# Patient Record
Sex: FEMALE | Race: OTHER | NOT HISPANIC OR LATINO
[De-identification: names, ages, dates, MRNs, and addresses within clinical notes are randomized per-mention and may not be internally consistent; named-entity substitution may affect disease eponyms.]

---

## 2019-03-26 PROBLEM — Z00.00 ENCOUNTER FOR PREVENTIVE HEALTH EXAMINATION: Status: ACTIVE | Noted: 2019-03-26

## 2019-04-26 ENCOUNTER — APPOINTMENT (OUTPATIENT)
Dept: ORTHOPEDIC SURGERY | Facility: CLINIC | Age: 64
End: 2019-04-26
Payer: COMMERCIAL

## 2019-04-26 PROCEDURE — 99213 OFFICE O/P EST LOW 20 MIN: CPT

## 2019-04-26 NOTE — PHYSICAL EXAM
[Normal RLE] : Right Lower Extremity: No scars, rashes, lesions, ulcers, skin intact [Normal LLE] : Left Lower Extremity: No scars, rashes, lesions, ulcers, skin intact [Normal Touch] : sensation intact for touch [Normal] : No swelling, no edema, normal pedal pulses and normal temperature [Poor Appearance] : well-appearing [Acute Distress] : not in acute distress [Obese] : not obese [de-identified] : Right Lower Extremity\par o Knee :\par ¦ Inspection/Palpation : no tenderness to palpation along the joint lines, no swelling, mild varus alignment \par ¦ Range of Motion : 0 - 115 degrees, no crepitus\par ¦ Stability : no valgus or varus instability present on provocative testing, Lachman’s Test (-)\par ¦ Strength : flexion and extension 5/5\par o Muscle Bulk : normal muscle bulk present\par o Skin : no erythema, no ecchymosis \par o Sensation : sensation to pin intact\par o Vascular Exam : no edema, no cyanosis, dorsalis pedis artery pulse 2+, posterior tibial artery pulse 2+ \par \par Left Lower Extremity\par o Knee :\par ¦ Inspection/Palpation : no tenderness to palpation along the joint lines, trace effusion, mild varus alignment \par ¦ Range of Motion : 0 - 130 degrees, no crepitus\par ¦ Stability : no valgus or varus instability present on provocative testing, Lachman’s Test (-)\par ¦ Strength : flexion and extension 5/5\par o Muscle Bulk : normal muscle bulk present\par o Skin : no erythema, no ecchymosis \par o Sensation : sensation to pin intact\par o Vascular Exam : no edema, no cyanosis, dorsalis pedis artery pulse 2+, posterior tibial artery pulse 2+

## 2019-04-26 NOTE — DISCUSSION/SUMMARY
[de-identified] : The underlying pathophysiology was reviewed in great detail with the patient as well as the various treatment options, including ice, analgesics, NSAIDs, Physical therapy, steroid injections.\par \par The patient wishes to proceed with PHYSICAL THERAPY at this time and a prescription was given. \par \par FU PRN.

## 2019-04-26 NOTE — ADDENDUM
[FreeTextEntry1] : I, Luh Llanes, acted solely as a scribe for Dr. Thad Alex on this date 04/26/2019.

## 2019-04-26 NOTE — END OF VISIT
[FreeTextEntry3] : All medical record entries made by the Zaidibkathi were at my, Dr. Thad Alex, direction and personally dictated by me on 04/26/2019. I have reviewed the chart and agree that the record accurately reflects my personal performance of the history, physical exam, assessment and plan. I have also personally directed, reviewed, and agreed with the chart.

## 2019-04-26 NOTE — HISTORY OF PRESENT ILLNESS
[de-identified] : 63 year old female presents for an evaluation of bilateral knee pain, she has been diagnosed with moderate to advanced medial compartment osteoarthritis. She describes a constant aching pain along the medial aspect of her knees that is exacerbated with walking, bending, and climbing stairs. She has been treated with corticosteroid injections and physical therapy in the past and says that it helped to improve her symptoms, she would like to return to physical therapy at this time.

## 2019-05-10 ENCOUNTER — TRANSCRIPTION ENCOUNTER (OUTPATIENT)
Age: 64
End: 2019-05-10

## 2021-05-11 ENCOUNTER — NON-APPOINTMENT (OUTPATIENT)
Age: 66
End: 2021-05-11

## 2021-05-11 ENCOUNTER — APPOINTMENT (OUTPATIENT)
Dept: ORTHOPEDIC SURGERY | Facility: CLINIC | Age: 66
End: 2021-05-11
Payer: COMMERCIAL

## 2021-05-11 VITALS — WEIGHT: 126 LBS | HEIGHT: 60 IN | BODY MASS INDEX: 24.74 KG/M2

## 2021-05-11 PROCEDURE — 73564 X-RAY EXAM KNEE 4 OR MORE: CPT | Mod: LT

## 2021-05-11 PROCEDURE — 99213 OFFICE O/P EST LOW 20 MIN: CPT

## 2021-05-11 PROCEDURE — 99072 ADDL SUPL MATRL&STAF TM PHE: CPT

## 2021-05-11 NOTE — DISCUSSION/SUMMARY
[de-identified] : The underlying pathophysiology was reviewed in great detail with the patient as well as the various treatment options, including ice, analgesics, NSAIDs, Physical therapy, steroid injections, hyaluronic gel injections. \par \par The patient wishes to proceed with PHYSICAL THERAPY at this time and a prescription was given. \par \par Activity modifications and restrictions were discussed. I advised avoiding deep bending, squatting and high intensity activity.\par \par I have recommended utilizing a knee sleeve to provide added support and stability.\par \par A letter was provided for motor transportation today. \par \par FU 6 weeks or prn. \par \par All questions were answered, all alternatives discussed and the patient is in complete agreement with that plan. Follow-up appointment as instructed. Any issues and the patient will call or come in sooner.

## 2021-05-11 NOTE — PHYSICAL EXAM
[Normal RLE] : Right Lower Extremity: No scars, rashes, lesions, ulcers, skin intact [Normal LLE] : Left Lower Extremity: No scars, rashes, lesions, ulcers, skin intact [Normal Touch] : sensation intact for touch [Normal] : No swelling, no edema, normal pedal pulses and normal temperature [Poor Appearance] : well-appearing [Acute Distress] : not in acute distress [Obese] : not obese [de-identified] : Right Lower Extremity\par o Knee :\par ¦ Inspection/Palpation :tenderness to palpation along the joint lines, no swelling, mild varus alignment \par ¦ Range of Motion : 0 - 115 degrees, no crepitus\par ¦ Stability : no valgus or varus instability present on provocative testing, Lachman’s Test (-)\par ¦ Strength : flexion and extension 5/5\par o Muscle Bulk : normal muscle bulk present\par o Skin : no erythema, no ecchymosis \par o Sensation : sensation to pin intact\par o Vascular Exam : no edema, no cyanosis, dorsalis pedis artery pulse 2+, posterior tibial artery pulse 2+ \par \par Left Lower Extremity\par o Knee :\par ¦ Inspection/Palpation : no tenderness to palpation along the joint lines, trace effusion, mild varus alignment \par ¦ Range of Motion : 0 - 130 degrees, no crepitus\par ¦ Stability : no valgus or varus instability present on provocative testing, Lachman’s Test (-)\par ¦ Strength : flexion and extension 5/5\par o Muscle Bulk : normal muscle bulk present\par o Skin : no erythema, no ecchymosis \par o Sensation : sensation to pin intact\par o Vascular Exam : no edema, no cyanosis, dorsalis pedis artery pulse 2+, posterior tibial artery pulse 2+  [de-identified] : o Right Knee : AP, lateral, sunrise, and Steward views of the knee were obtained, there are no soft tissue abnormalities, no fractures, alignment is normal, moderate to severe tricompartmental osteoarthritis with bone on bone apposition of the medial compartment , normal bone density, no bony lesions.\par \par o Left Knee : AP, lateral, sunrise, and Steward views of the knee were obtained, there are no soft tissue abnormalities, no fractures, alignment is normal,moderate to severe tricompartmental osteoarthritis with bone on bone apposition of the medial compartment normal bone density, no bony lesions.\par \par

## 2021-05-11 NOTE — HISTORY OF PRESENT ILLNESS
[de-identified] : 65 year old female presents for an evaluation of bilateral knee pain, she has been diagnosed with moderate to advanced medial compartment osteoarthritis. Patient has completed multiple courses of physical therapy for the knees noting good relief in symptoms. She notes when she is not in physical therapy her pain increases.  She describes a constant aching pain along the medial aspect of her knees that is exacerbated with walking, bending, and climbing stairs. She has been treated with corticosteroid injections and physical therapy in the past and says that it helped to improve her symptoms, she would like to return to physical therapy at this time.

## 2021-11-19 ENCOUNTER — APPOINTMENT (OUTPATIENT)
Dept: ORTHOPEDIC SURGERY | Facility: CLINIC | Age: 66
End: 2021-11-19
Payer: COMMERCIAL

## 2021-11-19 VITALS — WEIGHT: 124 LBS | BODY MASS INDEX: 24.35 KG/M2 | HEIGHT: 60 IN

## 2021-11-19 PROCEDURE — 99213 OFFICE O/P EST LOW 20 MIN: CPT

## 2021-11-19 NOTE — HISTORY OF PRESENT ILLNESS
[de-identified] : 65 year old female presents for an evaluation of bilateral knee pain, she has been diagnosed with moderate to advanced medial compartment osteoarthritis. Patient has completed multiple courses of physical therapy for the knees noting good relief in symptoms. She notes when she is not in physical therapy her pain increases.  She describes a constant aching pain along the medial aspect of her knees that is exacerbated with walking, bending, and climbing stairs. She has been treated with corticosteroid injections and physical therapy in the past and says that it helped to improve her symptoms, she would like to return to physical therapy at this time.

## 2021-11-19 NOTE — DISCUSSION/SUMMARY
[de-identified] : The underlying pathophysiology was reviewed in great detail with the patient as well as the various treatment options, including ice, analgesics, NSAIDs, Physical therapy, steroid injections, hyaluronic gel injections. \par \par The patient wishes to proceed with gel injections\par \par Monovisc will be ordered for both knees\par \par FU 6 weeks or prn. \par \par All questions were answered, all alternatives discussed and the patient is in complete agreement with that plan. Follow-up appointment as instructed. Any issues and the patient will call or come in sooner.

## 2021-11-19 NOTE — PHYSICAL EXAM
[Normal RLE] : Right Lower Extremity: No scars, rashes, lesions, ulcers, skin intact [Normal LLE] : Left Lower Extremity: No scars, rashes, lesions, ulcers, skin intact [Normal Touch] : sensation intact for touch [Normal] : No swelling, no edema, normal pedal pulses and normal temperature [Poor Appearance] : well-appearing [Acute Distress] : not in acute distress [Obese] : not obese [de-identified] : Right Lower Extremity\par o Knee :\par ¦ Inspection/Palpation :tenderness to palpation along the joint lines, no swelling, mild varus alignment \par ¦ Range of Motion : 0 - 115 degrees, no crepitus\par ¦ Stability : no valgus or varus instability present on provocative testing, Lachman’s Test (-)\par ¦ Strength : flexion and extension 5/5\par o Muscle Bulk : normal muscle bulk present\par o Skin : no erythema, no ecchymosis \par o Sensation : sensation to pin intact\par o Vascular Exam : no edema, no cyanosis, dorsalis pedis artery pulse 2+, posterior tibial artery pulse 2+ \par \par Left Lower Extremity\par o Knee :\par ¦ Inspection/Palpation : no tenderness to palpation along the joint lines, trace effusion, mild varus alignment \par ¦ Range of Motion : 0 - 130 degrees, no crepitus\par ¦ Stability : no valgus or varus instability present on provocative testing, Lachman’s Test (-)\par ¦ Strength : flexion and extension 5/5\par o Muscle Bulk : normal muscle bulk present\par o Skin : no erythema, no ecchymosis \par o Sensation : sensation to pin intact\par o Vascular Exam : no edema, no cyanosis, dorsalis pedis artery pulse 2+, posterior tibial artery pulse 2+

## 2021-12-17 ENCOUNTER — APPOINTMENT (OUTPATIENT)
Dept: ORTHOPEDIC SURGERY | Facility: CLINIC | Age: 66
End: 2021-12-17
Payer: COMMERCIAL

## 2021-12-17 VITALS — BODY MASS INDEX: 24.35 KG/M2 | WEIGHT: 124 LBS | HEIGHT: 60 IN

## 2021-12-17 PROCEDURE — 20610 DRAIN/INJ JOINT/BURSA W/O US: CPT | Mod: RT

## 2021-12-17 NOTE — DISCUSSION/SUMMARY
[de-identified] : The underlying pathophysiology was reviewed in great detail with the patient as well as the various treatment options, including ice, analgesics, NSAIDs, Physical therapy, steroid injections, hyaluronic gel injections. \par \par The patient elected to receive a Monovisc/ corticosteroid injectio and aspiration into her right knee today and tolerated it well. I instructed the patient on ROM exercises, and told them to take it easy. The use of ice and rest was reviewed with the patient. The patient may resume activities in a couple of days. I reminded the patient that it takes 4 to 6 weeks after the Monovisc injection to feel symptom relief \par  \par Activity modifications and restrictions were discussed. I advised avoiding deep bending, squatting and high intensity activity.\par \par A home exercise sheet was given and discussed with the patient to follow. \par \par I have recommended utilizing a knee sleeve to provide added support and stability. \par \par FU before 12/20/2021 for left knee Monovisc injection. \par \par All questions were answered, all alternatives discussed and the patient is in complete agreement with that plan. Follow-up appointment as instructed. Any issues and the patient will call or come in sooner.

## 2021-12-17 NOTE — PROCEDURE
[de-identified] : At this point I recommended a therapeutic injection and under sterile precautions an  injection of 4 cc of Monovisc (Lot: [id number] , Expiration: 06/2023), was placed into the joint of the RIGHT knee without complication followed by an injection of 0.5 cc 1% lidocaine with 0.5 cc of Kenalog and 0.5 cc of Dexamethasone - was placed into the joint of the RIGHT knee without complication,  after 20 cc of clear synovial fluid was aspirated and after several minutes, the patient felt significant relief.\par

## 2021-12-17 NOTE — HISTORY OF PRESENT ILLNESS
[de-identified] : 66 year old female presents for an evaluation of bilateral knee pain, she has been diagnosed with moderate to advanced medial compartment osteoarthritis. Patient has completed multiple courses of physical therapy for the knees noting good relief in symptoms. She notes when she is not in physical therapy her pain increases.  She describes a constant aching pain along the medial aspect of her knees that is exacerbated with walking, bending, and climbing stairs. She has been treated with corticosteroid injections and physical therapy in the past and says that it helped to improve her symptoms. She is here today for a right knee monovisc injection.

## 2021-12-17 NOTE — PHYSICAL EXAM
[Normal RLE] : Right Lower Extremity: No scars, rashes, lesions, ulcers, skin intact [Normal LLE] : Left Lower Extremity: No scars, rashes, lesions, ulcers, skin intact [Normal Touch] : sensation intact for touch [Normal] : No swelling, no edema, normal pedal pulses and normal temperature [Poor Appearance] : well-appearing [Acute Distress] : not in acute distress [Obese] : not obese [de-identified] : Right Lower Extremity\par o Knee :\par ¦ Inspection/Palpation :tenderness to palpation along the joint lines, no swelling, mild varus alignment \par ¦ Range of Motion : 0 - 115 degrees, no crepitus\par ¦ Stability : no valgus or varus instability present on provocative testing, Lachman’s Test (-)\par ¦ Strength : flexion and extension 5/5\par o Muscle Bulk : normal muscle bulk present\par o Skin : no erythema, no ecchymosis \par o Sensation : sensation to pin intact\par o Vascular Exam : no edema, no cyanosis, dorsalis pedis artery pulse 2+, posterior tibial artery pulse 2+ \par \par Left Lower Extremity\par o Knee :\par ¦ Inspection/Palpation : no tenderness to palpation along the joint lines, trace effusion, mild varus alignment \par ¦ Range of Motion : 0 - 130 degrees, no crepitus\par ¦ Stability : no valgus or varus instability present on provocative testing, Lachman’s Test (-)\par ¦ Strength : flexion and extension 5/5\par o Muscle Bulk : normal muscle bulk present\par o Skin : no erythema, no ecchymosis \par o Sensation : sensation to pin intact\par o Vascular Exam : no edema, no cyanosis, dorsalis pedis artery pulse 2+, posterior tibial artery pulse 2+

## 2021-12-28 ENCOUNTER — APPOINTMENT (OUTPATIENT)
Dept: ORTHOPEDIC SURGERY | Facility: CLINIC | Age: 66
End: 2021-12-28
Payer: COMMERCIAL

## 2021-12-28 VITALS — BODY MASS INDEX: 24.35 KG/M2 | HEIGHT: 60 IN | WEIGHT: 124 LBS

## 2021-12-28 PROCEDURE — 20610 DRAIN/INJ JOINT/BURSA W/O US: CPT | Mod: LT

## 2021-12-29 NOTE — PROCEDURE
[de-identified] : At this point I recommended a therapeutic injection and under sterile precautions an  injection of 4 cc of Monovisc (Lot: [id number] , Expiration: 03/31/2023), was placed into the joint of the Left knee without complication

## 2021-12-29 NOTE — DISCUSSION/SUMMARY
[de-identified] : The underlying pathophysiology was reviewed in great detail with the patient as well as the various treatment options, including ice, analgesics, NSAIDs, Physical therapy, steroid injections, hyaluronic gel injections. \par \par The patient elected to receive a Monovisc injection and aspiration into her left knee today and tolerated it well. I instructed the patient on ROM exercises, and told them to take it easy. The use of ice and rest was reviewed with the patient. The patient may resume activities in a couple of days. I reminded the patient that it takes 4 to 6 weeks after the Monovisc injection to feel symptom relief \par  \par Activity modifications and restrictions were discussed. I advised avoiding deep bending, squatting and high intensity activity.\par \par I have recommended utilizing a knee sleeve to provide added support and stability. \par \par FU 12 weeks or PRN\par \par All questions were answered, all alternatives discussed and the patient is in complete agreement with that plan. Follow-up appointment as instructed. Any issues and the patient will call or come in sooner.

## 2021-12-29 NOTE — PHYSICAL EXAM
[Normal RLE] : Right Lower Extremity: No scars, rashes, lesions, ulcers, skin intact [Normal LLE] : Left Lower Extremity: No scars, rashes, lesions, ulcers, skin intact [Normal Touch] : sensation intact for touch [Normal] : No swelling, no edema, normal pedal pulses and normal temperature [Poor Appearance] : well-appearing [Acute Distress] : not in acute distress [Obese] : not obese [de-identified] : Right Lower Extremity\par o Knee :\par ¦ Inspection/Palpation : mild tenderness to palpation along the joint lines, no swelling, mild varus alignment \par ¦ Range of Motion : 0 - 115 degrees, no crepitus\par ¦ Stability : no valgus or varus instability present on provocative testing, Lachman’s Test (-)\par ¦ Strength : flexion and extension 5/5\par o Muscle Bulk : normal muscle bulk present\par o Skin : no erythema, no ecchymosis \par o Sensation : sensation to pin intact\par o Vascular Exam : no edema, no cyanosis, dorsalis pedis artery pulse 2+, posterior tibial artery pulse 2+ \par \par Left Lower Extremity\par o Knee :\par ¦ Inspection/Palpation : mild tenderness to palpation about the medial knee, trace effusion, mild varus alignment \par ¦ Range of Motion : 0 - 130 degrees, no crepitus\par ¦ Stability : no valgus or varus instability present on provocative testing, Lachman’s Test (-)\par ¦ Strength : flexion and extension 5/5\par o Muscle Bulk : normal muscle bulk present\par o Skin : no erythema, no ecchymosis \par o Sensation : sensation to pin intact\par o Vascular Exam : no edema, no cyanosis, dorsalis pedis artery pulse 2+, posterior tibial artery pulse 2+

## 2021-12-29 NOTE — HISTORY OF PRESENT ILLNESS
[de-identified] : 66 year old female presents for follow up of bilateral knee pain. On 12/17/21 she received a right knee monovisc injection and presents today due to her left knee pain. Patient denies any adverse reaction to right knee monovisc injection. Patient has been diagnosed with moderate to advanced medial compartment osteoarthritis. Patient has completed multiple courses of physical therapy for the knees noting good relief in symptoms. She notes when she is not in physical therapy her pain increases.  She describes a constant aching pain along the medial aspect of her knees that is exacerbated with walking, bending, and climbing stairs. She is here today for right knee Monovisc injection.

## 2022-06-21 ENCOUNTER — APPOINTMENT (OUTPATIENT)
Dept: ORTHOPEDIC SURGERY | Facility: CLINIC | Age: 67
End: 2022-06-21

## 2022-07-05 ENCOUNTER — APPOINTMENT (OUTPATIENT)
Dept: ORTHOPEDIC SURGERY | Facility: CLINIC | Age: 67
End: 2022-07-05

## 2022-07-05 VITALS — BODY MASS INDEX: 24.74 KG/M2 | WEIGHT: 126 LBS | HEIGHT: 60 IN

## 2022-07-05 PROCEDURE — 99214 OFFICE O/P EST MOD 30 MIN: CPT | Mod: 25

## 2022-07-05 PROCEDURE — 20610 DRAIN/INJ JOINT/BURSA W/O US: CPT | Mod: LT

## 2022-10-11 ENCOUNTER — APPOINTMENT (OUTPATIENT)
Dept: ORTHOPEDIC SURGERY | Facility: CLINIC | Age: 67
End: 2022-10-11

## 2022-10-11 PROCEDURE — 20610 DRAIN/INJ JOINT/BURSA W/O US: CPT | Mod: RT

## 2022-10-11 PROCEDURE — 99214 OFFICE O/P EST MOD 30 MIN: CPT | Mod: 25

## 2022-12-19 ENCOUNTER — RX RENEWAL (OUTPATIENT)
Age: 67
End: 2022-12-19

## 2022-12-19 RX ORDER — DICLOFENAC SODIUM 75 MG/1
75 TABLET, DELAYED RELEASE ORAL
Qty: 60 | Refills: 1 | Status: ACTIVE | COMMUNITY
Start: 2022-10-11 | End: 1900-01-01

## 2023-05-30 ENCOUNTER — APPOINTMENT (OUTPATIENT)
Dept: ORTHOPEDIC SURGERY | Facility: CLINIC | Age: 68
End: 2023-05-30
Payer: COMMERCIAL

## 2023-05-30 VITALS
DIASTOLIC BLOOD PRESSURE: 78 MMHG | HEIGHT: 60 IN | HEART RATE: 88 BPM | WEIGHT: 124 LBS | SYSTOLIC BLOOD PRESSURE: 150 MMHG | BODY MASS INDEX: 24.35 KG/M2

## 2023-05-30 PROCEDURE — 20610 DRAIN/INJ JOINT/BURSA W/O US: CPT | Mod: LT

## 2023-05-30 PROCEDURE — 99214 OFFICE O/P EST MOD 30 MIN: CPT | Mod: 25

## 2023-05-30 PROCEDURE — 73564 X-RAY EXAM KNEE 4 OR MORE: CPT | Mod: RT

## 2023-07-14 ENCOUNTER — APPOINTMENT (OUTPATIENT)
Dept: ORTHOPEDIC SURGERY | Facility: CLINIC | Age: 68
End: 2023-07-14

## 2023-08-15 ENCOUNTER — APPOINTMENT (OUTPATIENT)
Dept: ORTHOPEDIC SURGERY | Facility: CLINIC | Age: 68
End: 2023-08-15
Payer: COMMERCIAL

## 2023-08-15 PROCEDURE — 99214 OFFICE O/P EST MOD 30 MIN: CPT

## 2023-11-22 ENCOUNTER — OUTPATIENT (OUTPATIENT)
Dept: OUTPATIENT SERVICES | Facility: HOSPITAL | Age: 68
LOS: 1 days | End: 2023-11-22
Payer: COMMERCIAL

## 2023-11-22 VITALS
DIASTOLIC BLOOD PRESSURE: 72 MMHG | RESPIRATION RATE: 14 BRPM | WEIGHT: 121.92 LBS | HEART RATE: 87 BPM | OXYGEN SATURATION: 99 % | HEIGHT: 60 IN | SYSTOLIC BLOOD PRESSURE: 130 MMHG | TEMPERATURE: 98 F

## 2023-11-22 DIAGNOSIS — Z01.818 ENCOUNTER FOR OTHER PREPROCEDURAL EXAMINATION: ICD-10-CM

## 2023-11-22 DIAGNOSIS — M17.11 UNILATERAL PRIMARY OSTEOARTHRITIS, RIGHT KNEE: ICD-10-CM

## 2023-11-22 LAB
ALBUMIN SERPL ELPH-MCNC: 4.4 G/DL — SIGNIFICANT CHANGE UP (ref 3.3–5)
ALBUMIN SERPL ELPH-MCNC: 4.4 G/DL — SIGNIFICANT CHANGE UP (ref 3.3–5)
ALP SERPL-CCNC: 64 U/L — SIGNIFICANT CHANGE UP (ref 30–120)
ALP SERPL-CCNC: 64 U/L — SIGNIFICANT CHANGE UP (ref 30–120)
ALT FLD-CCNC: 28 U/L — SIGNIFICANT CHANGE UP (ref 10–60)
ALT FLD-CCNC: 28 U/L — SIGNIFICANT CHANGE UP (ref 10–60)
ANION GAP SERPL CALC-SCNC: 10 MMOL/L — SIGNIFICANT CHANGE UP (ref 5–17)
ANION GAP SERPL CALC-SCNC: 10 MMOL/L — SIGNIFICANT CHANGE UP (ref 5–17)
APTT BLD: 32.1 SEC — SIGNIFICANT CHANGE UP (ref 24.5–35.6)
APTT BLD: 32.1 SEC — SIGNIFICANT CHANGE UP (ref 24.5–35.6)
AST SERPL-CCNC: 22 U/L — SIGNIFICANT CHANGE UP (ref 10–40)
AST SERPL-CCNC: 22 U/L — SIGNIFICANT CHANGE UP (ref 10–40)
BILIRUB SERPL-MCNC: 0.5 MG/DL — SIGNIFICANT CHANGE UP (ref 0.2–1.2)
BILIRUB SERPL-MCNC: 0.5 MG/DL — SIGNIFICANT CHANGE UP (ref 0.2–1.2)
BLD GP AB SCN SERPL QL: SIGNIFICANT CHANGE UP
BLD GP AB SCN SERPL QL: SIGNIFICANT CHANGE UP
BUN SERPL-MCNC: 18 MG/DL — SIGNIFICANT CHANGE UP (ref 7–23)
BUN SERPL-MCNC: 18 MG/DL — SIGNIFICANT CHANGE UP (ref 7–23)
CALCIUM SERPL-MCNC: 9.8 MG/DL — SIGNIFICANT CHANGE UP (ref 8.4–10.5)
CALCIUM SERPL-MCNC: 9.8 MG/DL — SIGNIFICANT CHANGE UP (ref 8.4–10.5)
CHLORIDE SERPL-SCNC: 101 MMOL/L — SIGNIFICANT CHANGE UP (ref 96–108)
CHLORIDE SERPL-SCNC: 101 MMOL/L — SIGNIFICANT CHANGE UP (ref 96–108)
CO2 SERPL-SCNC: 28 MMOL/L — SIGNIFICANT CHANGE UP (ref 22–31)
CO2 SERPL-SCNC: 28 MMOL/L — SIGNIFICANT CHANGE UP (ref 22–31)
CREAT SERPL-MCNC: 0.68 MG/DL — SIGNIFICANT CHANGE UP (ref 0.5–1.3)
CREAT SERPL-MCNC: 0.68 MG/DL — SIGNIFICANT CHANGE UP (ref 0.5–1.3)
EGFR: 94 ML/MIN/1.73M2 — SIGNIFICANT CHANGE UP
EGFR: 94 ML/MIN/1.73M2 — SIGNIFICANT CHANGE UP
GLUCOSE SERPL-MCNC: 98 MG/DL — SIGNIFICANT CHANGE UP (ref 70–99)
GLUCOSE SERPL-MCNC: 98 MG/DL — SIGNIFICANT CHANGE UP (ref 70–99)
HCT VFR BLD CALC: 41.1 % — SIGNIFICANT CHANGE UP (ref 34.5–45)
HCT VFR BLD CALC: 41.1 % — SIGNIFICANT CHANGE UP (ref 34.5–45)
HGB BLD-MCNC: 13.1 G/DL — SIGNIFICANT CHANGE UP (ref 11.5–15.5)
HGB BLD-MCNC: 13.1 G/DL — SIGNIFICANT CHANGE UP (ref 11.5–15.5)
INR BLD: 0.96 RATIO — SIGNIFICANT CHANGE UP (ref 0.85–1.18)
INR BLD: 0.96 RATIO — SIGNIFICANT CHANGE UP (ref 0.85–1.18)
MCHC RBC-ENTMCNC: 28.1 PG — SIGNIFICANT CHANGE UP (ref 27–34)
MCHC RBC-ENTMCNC: 28.1 PG — SIGNIFICANT CHANGE UP (ref 27–34)
MCHC RBC-ENTMCNC: 31.9 GM/DL — LOW (ref 32–36)
MCHC RBC-ENTMCNC: 31.9 GM/DL — LOW (ref 32–36)
MCV RBC AUTO: 88.2 FL — SIGNIFICANT CHANGE UP (ref 80–100)
MCV RBC AUTO: 88.2 FL — SIGNIFICANT CHANGE UP (ref 80–100)
NRBC # BLD: 0 /100 WBCS — SIGNIFICANT CHANGE UP (ref 0–0)
NRBC # BLD: 0 /100 WBCS — SIGNIFICANT CHANGE UP (ref 0–0)
PLATELET # BLD AUTO: 309 K/UL — SIGNIFICANT CHANGE UP (ref 150–400)
PLATELET # BLD AUTO: 309 K/UL — SIGNIFICANT CHANGE UP (ref 150–400)
POTASSIUM SERPL-MCNC: 3.5 MMOL/L — SIGNIFICANT CHANGE UP (ref 3.5–5.3)
POTASSIUM SERPL-MCNC: 3.5 MMOL/L — SIGNIFICANT CHANGE UP (ref 3.5–5.3)
POTASSIUM SERPL-SCNC: 3.5 MMOL/L — SIGNIFICANT CHANGE UP (ref 3.5–5.3)
POTASSIUM SERPL-SCNC: 3.5 MMOL/L — SIGNIFICANT CHANGE UP (ref 3.5–5.3)
PROT SERPL-MCNC: 7.7 G/DL — SIGNIFICANT CHANGE UP (ref 6–8.3)
PROT SERPL-MCNC: 7.7 G/DL — SIGNIFICANT CHANGE UP (ref 6–8.3)
PROTHROM AB SERPL-ACNC: 10.8 SEC — SIGNIFICANT CHANGE UP (ref 9.5–13)
PROTHROM AB SERPL-ACNC: 10.8 SEC — SIGNIFICANT CHANGE UP (ref 9.5–13)
RBC # BLD: 4.66 M/UL — SIGNIFICANT CHANGE UP (ref 3.8–5.2)
RBC # BLD: 4.66 M/UL — SIGNIFICANT CHANGE UP (ref 3.8–5.2)
RBC # FLD: 12.7 % — SIGNIFICANT CHANGE UP (ref 10.3–14.5)
RBC # FLD: 12.7 % — SIGNIFICANT CHANGE UP (ref 10.3–14.5)
SODIUM SERPL-SCNC: 139 MMOL/L — SIGNIFICANT CHANGE UP (ref 135–145)
SODIUM SERPL-SCNC: 139 MMOL/L — SIGNIFICANT CHANGE UP (ref 135–145)
WBC # BLD: 6.17 K/UL — SIGNIFICANT CHANGE UP (ref 3.8–10.5)
WBC # BLD: 6.17 K/UL — SIGNIFICANT CHANGE UP (ref 3.8–10.5)
WBC # FLD AUTO: 6.17 K/UL — SIGNIFICANT CHANGE UP (ref 3.8–10.5)
WBC # FLD AUTO: 6.17 K/UL — SIGNIFICANT CHANGE UP (ref 3.8–10.5)

## 2023-11-22 PROCEDURE — G0463: CPT

## 2023-11-22 PROCEDURE — 93005 ELECTROCARDIOGRAM TRACING: CPT

## 2023-11-22 PROCEDURE — 93010 ELECTROCARDIOGRAM REPORT: CPT

## 2023-11-22 NOTE — H&P PST ADULT - NSANTHOSAYNRD_GEN_A_CORE
No. VICTOR HUGO screening performed.  STOP BANG Legend: 0-2 = LOW Risk; 3-4 = INTERMEDIATE Risk; 5-8 = HIGH Risk

## 2023-11-22 NOTE — H&P PST ADULT - PROBLEM SELECTOR PLAN 1
scheduled for right knee replacement on 12/6/23  will obtain medical clearance   pre op instructions

## 2023-11-22 NOTE — H&P PST ADULT - NSICDXPASTMEDICALHX_GEN_ALL_CORE_FT
PAST MEDICAL HISTORY:  HLD (hyperlipidemia)     HTN (hypertension)     Osteoarthritis of right knee

## 2023-11-22 NOTE — H&P PST ADULT - HISTORY OF PRESENT ILLNESS
This is a 67 y/o female who injured her knee s/p fall 2015 presents with right knee pain ,Diclofenac PRN for pain with mils relied This is a 69 y/o female who injured her knee s/p fall 2015 presents with right knee pain ,Diclofenac PRN for pain with mils relied This is a 67 y/o female who injured her knee s/p fall 2015 presents with right knee pain ,Reports intermittent pain and worse pain when walking ,standing for long period time. Diclofenac PRN for pain with mild relief , scheduled for right knee replacement on 12/6/23

## 2023-11-23 LAB
A1C WITH ESTIMATED AVERAGE GLUCOSE RESULT: 6.1 % — HIGH (ref 4–5.6)
A1C WITH ESTIMATED AVERAGE GLUCOSE RESULT: 6.1 % — HIGH (ref 4–5.6)
ESTIMATED AVERAGE GLUCOSE: 128 MG/DL — HIGH (ref 68–114)
ESTIMATED AVERAGE GLUCOSE: 128 MG/DL — HIGH (ref 68–114)
MRSA PCR RESULT.: SIGNIFICANT CHANGE UP
MRSA PCR RESULT.: SIGNIFICANT CHANGE UP
S AUREUS DNA NOSE QL NAA+PROBE: DETECTED
S AUREUS DNA NOSE QL NAA+PROBE: DETECTED

## 2023-11-24 RX ORDER — MUPIROCIN 20 MG/G
1 OINTMENT TOPICAL
Qty: 1 | Refills: 0
Start: 2023-11-24 | End: 2023-11-28

## 2023-11-28 PROBLEM — M17.11 UNILATERAL PRIMARY OSTEOARTHRITIS, RIGHT KNEE: Chronic | Status: ACTIVE | Noted: 2023-11-22

## 2023-12-01 RX ORDER — CEFAZOLIN SODIUM 1 G
2000 VIAL (EA) INJECTION ONCE
Refills: 0 | Status: COMPLETED | OUTPATIENT
Start: 2023-12-06 | End: 2023-12-06

## 2023-12-01 RX ORDER — CHLORHEXIDINE GLUCONATE 213 G/1000ML
1 SOLUTION TOPICAL ONCE
Refills: 0 | Status: COMPLETED | OUTPATIENT
Start: 2023-12-06 | End: 2023-12-06

## 2023-12-01 RX ORDER — TRANEXAMIC ACID 100 MG/ML
1000 INJECTION, SOLUTION INTRAVENOUS ONCE
Refills: 0 | Status: COMPLETED | OUTPATIENT
Start: 2023-12-06 | End: 2023-12-06

## 2023-12-01 RX ORDER — ACETAMINOPHEN 500 MG
1000 TABLET ORAL ONCE
Refills: 0 | Status: COMPLETED | OUTPATIENT
Start: 2023-12-06 | End: 2023-12-06

## 2023-12-01 RX ORDER — APREPITANT 80 MG/1
40 CAPSULE ORAL ONCE
Refills: 0 | Status: COMPLETED | OUTPATIENT
Start: 2023-12-06 | End: 2023-12-06

## 2023-12-06 ENCOUNTER — RESULT REVIEW (OUTPATIENT)
Age: 68
End: 2023-12-06

## 2023-12-06 ENCOUNTER — INPATIENT (INPATIENT)
Facility: HOSPITAL | Age: 68
LOS: 2 days | Discharge: NURSING, REHAB OR LONG-TERM CARE | DRG: 470 | End: 2023-12-09
Attending: ORTHOPAEDIC SURGERY | Admitting: ORTHOPAEDIC SURGERY
Payer: COMMERCIAL

## 2023-12-06 ENCOUNTER — APPOINTMENT (OUTPATIENT)
Dept: ORTHOPEDIC SURGERY | Facility: HOSPITAL | Age: 68
End: 2023-12-06

## 2023-12-06 ENCOUNTER — TRANSCRIPTION ENCOUNTER (OUTPATIENT)
Age: 68
End: 2023-12-06

## 2023-12-06 VITALS
DIASTOLIC BLOOD PRESSURE: 72 MMHG | OXYGEN SATURATION: 99 % | TEMPERATURE: 97 F | SYSTOLIC BLOOD PRESSURE: 163 MMHG | HEIGHT: 60 IN | WEIGHT: 121.92 LBS | RESPIRATION RATE: 21 BRPM | HEART RATE: 90 BPM

## 2023-12-06 DIAGNOSIS — M17.11 UNILATERAL PRIMARY OSTEOARTHRITIS, RIGHT KNEE: ICD-10-CM

## 2023-12-06 PROBLEM — I10 ESSENTIAL (PRIMARY) HYPERTENSION: Chronic | Status: ACTIVE | Noted: 2023-11-22

## 2023-12-06 PROBLEM — E78.5 HYPERLIPIDEMIA, UNSPECIFIED: Chronic | Status: ACTIVE | Noted: 2023-11-22

## 2023-12-06 PROCEDURE — 88305 TISSUE EXAM BY PATHOLOGIST: CPT | Mod: 26

## 2023-12-06 PROCEDURE — 88311 DECALCIFY TISSUE: CPT | Mod: 26

## 2023-12-06 DEVICE — TRAY TIB I-BEAM FIX BAR 67MM: Type: IMPLANTABLE DEVICE | Site: RIGHT | Status: FUNCTIONAL

## 2023-12-06 DEVICE — IMPLANTABLE DEVICE: Type: IMPLANTABLE DEVICE | Site: RIGHT | Status: FUNCTIONAL

## 2023-12-06 DEVICE — FEMORAL 62.5 RIGHT: Type: IMPLANTABLE DEVICE | Site: RIGHT | Status: FUNCTIONAL

## 2023-12-06 DEVICE — BONE WAX 2.5GM: Type: IMPLANTABLE DEVICE | Site: RIGHT | Status: FUNCTIONAL

## 2023-12-06 DEVICE — PATELLA 31MMX8.0MM: Type: IMPLANTABLE DEVICE | Site: RIGHT | Status: FUNCTIONAL

## 2023-12-06 DEVICE — BEARING TIB VANGUARD VE PS 63/67X10MM: Type: IMPLANTABLE DEVICE | Site: RIGHT | Status: FUNCTIONAL

## 2023-12-06 RX ORDER — CEFAZOLIN SODIUM 1 G
2000 VIAL (EA) INJECTION EVERY 8 HOURS
Refills: 0 | Status: COMPLETED | OUTPATIENT
Start: 2023-12-06 | End: 2023-12-07

## 2023-12-06 RX ORDER — ACETAMINOPHEN 500 MG
2 TABLET ORAL
Qty: 0 | Refills: 0 | DISCHARGE
Start: 2023-12-06

## 2023-12-06 RX ORDER — ATORVASTATIN CALCIUM 80 MG/1
20 TABLET, FILM COATED ORAL AT BEDTIME
Refills: 0 | Status: DISCONTINUED | OUTPATIENT
Start: 2023-12-06 | End: 2023-12-09

## 2023-12-06 RX ORDER — CELECOXIB 200 MG/1
1 CAPSULE ORAL
Qty: 56 | Refills: 0
Start: 2023-12-06 | End: 2024-01-02

## 2023-12-06 RX ORDER — HYDROMORPHONE HYDROCHLORIDE 2 MG/ML
0.2 INJECTION INTRAMUSCULAR; INTRAVENOUS; SUBCUTANEOUS
Refills: 0 | Status: DISCONTINUED | OUTPATIENT
Start: 2023-12-06 | End: 2023-12-06

## 2023-12-06 RX ORDER — HYDROMORPHONE HYDROCHLORIDE 2 MG/ML
0.5 INJECTION INTRAMUSCULAR; INTRAVENOUS; SUBCUTANEOUS
Refills: 0 | Status: DISCONTINUED | OUTPATIENT
Start: 2023-12-06 | End: 2023-12-09

## 2023-12-06 RX ORDER — SODIUM CHLORIDE 9 MG/ML
500 INJECTION INTRAMUSCULAR; INTRAVENOUS; SUBCUTANEOUS ONCE
Refills: 0 | Status: COMPLETED | OUTPATIENT
Start: 2023-12-06 | End: 2023-12-06

## 2023-12-06 RX ORDER — ATORVASTATIN CALCIUM 80 MG/1
1 TABLET, FILM COATED ORAL
Refills: 0 | DISCHARGE

## 2023-12-06 RX ORDER — OMEPRAZOLE 10 MG/1
1 CAPSULE, DELAYED RELEASE ORAL
Qty: 28 | Refills: 0
Start: 2023-12-06 | End: 2024-01-02

## 2023-12-06 RX ORDER — SENNA PLUS 8.6 MG/1
2 TABLET ORAL AT BEDTIME
Refills: 0 | Status: DISCONTINUED | OUTPATIENT
Start: 2023-12-06 | End: 2023-12-09

## 2023-12-06 RX ORDER — DICLOFENAC SODIUM 75 MG/1
1 TABLET, DELAYED RELEASE ORAL
Refills: 0 | DISCHARGE

## 2023-12-06 RX ORDER — DEXAMETHASONE 0.5 MG/5ML
8 ELIXIR ORAL ONCE
Refills: 0 | Status: COMPLETED | OUTPATIENT
Start: 2023-12-07 | End: 2023-12-07

## 2023-12-06 RX ORDER — ASPIRIN/CALCIUM CARB/MAGNESIUM 324 MG
81 TABLET ORAL EVERY 12 HOURS
Refills: 0 | Status: DISCONTINUED | OUTPATIENT
Start: 2023-12-07 | End: 2023-12-09

## 2023-12-06 RX ORDER — SODIUM CHLORIDE 9 MG/ML
1000 INJECTION, SOLUTION INTRAVENOUS
Refills: 0 | Status: DISCONTINUED | OUTPATIENT
Start: 2023-12-06 | End: 2023-12-06

## 2023-12-06 RX ORDER — ASPIRIN/CALCIUM CARB/MAGNESIUM 324 MG
1 TABLET ORAL
Qty: 56 | Refills: 0
Start: 2023-12-06 | End: 2024-01-02

## 2023-12-06 RX ORDER — ACETAMINOPHEN 500 MG
1000 TABLET ORAL EVERY 8 HOURS
Refills: 0 | Status: DISCONTINUED | OUTPATIENT
Start: 2023-12-07 | End: 2023-12-09

## 2023-12-06 RX ORDER — PANTOPRAZOLE SODIUM 20 MG/1
40 TABLET, DELAYED RELEASE ORAL
Refills: 0 | Status: DISCONTINUED | OUTPATIENT
Start: 2023-12-06 | End: 2023-12-09

## 2023-12-06 RX ORDER — POLYETHYLENE GLYCOL 3350 17 G/17G
17 POWDER, FOR SOLUTION ORAL AT BEDTIME
Refills: 0 | Status: DISCONTINUED | OUTPATIENT
Start: 2023-12-06 | End: 2023-12-09

## 2023-12-06 RX ORDER — CELECOXIB 200 MG/1
200 CAPSULE ORAL EVERY 12 HOURS
Refills: 0 | Status: DISCONTINUED | OUTPATIENT
Start: 2023-12-06 | End: 2023-12-09

## 2023-12-06 RX ORDER — MAGNESIUM HYDROXIDE 400 MG/1
30 TABLET, CHEWABLE ORAL DAILY
Refills: 0 | Status: DISCONTINUED | OUTPATIENT
Start: 2023-12-06 | End: 2023-12-09

## 2023-12-06 RX ORDER — OXYCODONE HYDROCHLORIDE 5 MG/1
10 TABLET ORAL
Refills: 0 | Status: DISCONTINUED | OUTPATIENT
Start: 2023-12-06 | End: 2023-12-09

## 2023-12-06 RX ORDER — ONDANSETRON 8 MG/1
4 TABLET, FILM COATED ORAL EVERY 6 HOURS
Refills: 0 | Status: DISCONTINUED | OUTPATIENT
Start: 2023-12-06 | End: 2023-12-09

## 2023-12-06 RX ORDER — ONDANSETRON 8 MG/1
4 TABLET, FILM COATED ORAL ONCE
Refills: 0 | Status: DISCONTINUED | OUTPATIENT
Start: 2023-12-06 | End: 2023-12-06

## 2023-12-06 RX ORDER — HYDROMORPHONE HYDROCHLORIDE 2 MG/ML
0.5 INJECTION INTRAMUSCULAR; INTRAVENOUS; SUBCUTANEOUS
Refills: 0 | Status: DISCONTINUED | OUTPATIENT
Start: 2023-12-06 | End: 2023-12-06

## 2023-12-06 RX ORDER — OXYCODONE HYDROCHLORIDE 5 MG/1
5 TABLET ORAL
Refills: 0 | Status: DISCONTINUED | OUTPATIENT
Start: 2023-12-06 | End: 2023-12-09

## 2023-12-06 RX ORDER — ACETAMINOPHEN 500 MG
1000 TABLET ORAL EVERY 6 HOURS
Refills: 0 | Status: COMPLETED | OUTPATIENT
Start: 2023-12-06 | End: 2023-12-07

## 2023-12-06 RX ORDER — AMLODIPINE BESYLATE 2.5 MG/1
1 TABLET ORAL
Refills: 0 | DISCHARGE

## 2023-12-06 RX ORDER — SODIUM CHLORIDE 9 MG/ML
1000 INJECTION, SOLUTION INTRAVENOUS
Refills: 0 | Status: DISCONTINUED | OUTPATIENT
Start: 2023-12-06 | End: 2023-12-09

## 2023-12-06 RX ADMIN — Medication 400 MILLIGRAM(S): at 18:02

## 2023-12-06 RX ADMIN — CHLORHEXIDINE GLUCONATE 1 APPLICATION(S): 213 SOLUTION TOPICAL at 11:05

## 2023-12-06 RX ADMIN — Medication 100 MILLIGRAM(S): at 20:02

## 2023-12-06 RX ADMIN — OXYCODONE HYDROCHLORIDE 5 MILLIGRAM(S): 5 TABLET ORAL at 21:00

## 2023-12-06 RX ADMIN — APREPITANT 40 MILLIGRAM(S): 80 CAPSULE ORAL at 11:06

## 2023-12-06 RX ADMIN — Medication 1000 MILLIGRAM(S): at 18:06

## 2023-12-06 RX ADMIN — SODIUM CHLORIDE 500 MILLILITER(S): 9 INJECTION INTRAMUSCULAR; INTRAVENOUS; SUBCUTANEOUS at 16:09

## 2023-12-06 RX ADMIN — OXYCODONE HYDROCHLORIDE 5 MILLIGRAM(S): 5 TABLET ORAL at 20:02

## 2023-12-06 RX ADMIN — SODIUM CHLORIDE 75 MILLILITER(S): 9 INJECTION, SOLUTION INTRAVENOUS at 15:38

## 2023-12-06 RX ADMIN — CELECOXIB 200 MILLIGRAM(S): 200 CAPSULE ORAL at 21:39

## 2023-12-06 RX ADMIN — CELECOXIB 200 MILLIGRAM(S): 200 CAPSULE ORAL at 21:34

## 2023-12-06 RX ADMIN — ATORVASTATIN CALCIUM 20 MILLIGRAM(S): 80 TABLET, FILM COATED ORAL at 21:34

## 2023-12-06 RX ADMIN — SODIUM CHLORIDE 500 MILLILITER(S): 9 INJECTION INTRAMUSCULAR; INTRAVENOUS; SUBCUTANEOUS at 18:02

## 2023-12-06 NOTE — DISCHARGE NOTE PROVIDER - HOSPITAL COURSE
This patient was admitted to [facility] with a history of severe degenerative joint disease of the right knee.  Patient underwent Pre-Surgical Testing and was medically cleared to undergo elective procedure. Patient underwent right total knee replacement by Dr. Isai MD on 12/6/23  . Procedure was well tolerated.  No operative or anushka-operative complications arose during patient's hospital course.  Patient received antibiotic according to SCIP guidelines for infection prevention.  ASA 81mg po  bid  was given for DVT prophylaxis, in addition to the use of SCDs.  Anesthesia, Medical Hospitalist, Physical Therapy and Occupational Therapy were consulted. Patient is stable for discharge with a good prognosis.  Appropriate discharge instructions and medications are provided in this document. This patient was admitted to [facility] with a history of severe degenerative joint disease of the right knee.  Patient underwent Pre-Surgical Testing and was medically cleared to undergo elective procedure. Patient underwent right total knee replacement by Dr. Isai MD on 12/6/23  . Procedure was well tolerated.  No operative or anushka-operative complications arose during patient's hospital course.  Patient received antibiotic according to SCIP guidelines for infection prevention.  ASA 81mg po  bid  was given for DVT prophylaxis, in addition to the use of SCDs.  Anesthesia, Medical Hospitalist, Physical Therapy and Occupational Therapy were consulted. Patient is stable for discharge with a good prognosis.  Appropriate discharge instructions and medications are provided in this document. This patient was admitted to [facility] with a history of severe degenerative joint disease of the right knee.  Patient underwent Pre-Surgical Testing and was medically cleared to undergo elective procedure. Patient underwent right total knee replacement by Dr. Isai MD on 12/6/23  . Procedure was well tolerated.  No operative or anushka-operative complications arose during patient's hospital course.  Patient received antibiotic according to SCIP guidelines for infection prevention.  ASA 81mg po  bid  was given for DVT prophylaxis, in addition to the use of SCDs.  Anesthesia, Medical Hospitalist, Physical Therapy and Occupational Therapy were consulted. Patient is stable for discharge with a good prognosis.  Appropriate discharge instructions and medications are provided in this document.   Patient is going home with home care (PT/OT/Skilled Nursing) This patient was admitted to [facility] with a history of severe degenerative joint disease of the right knee.  Patient underwent Pre-Surgical Testing and was medically cleared to undergo elective procedure. Patient underwent right total knee replacement by Dr. Isai MD on 12/6/23  . Procedure was well tolerated.  No operative or anushka-operative complications arose during patient's hospital course.  Patient received antibiotic according to SCIP guidelines for infection prevention.  ASA 81mg po  bid  was given for DVT prophylaxis, in addition to the use of SCDs.  Anesthesia, Medical Hospitalist, Physical Therapy and Occupational Therapy were consulted. Patient is stable for discharge with a good prognosis.  Appropriate discharge instructions and medications are provided in this document.   Patient is going home with home care (PT/OT/Skilled Nursing) This patient was admitted to [facility] with a history of severe degenerative joint disease of the right knee.  Patient underwent Pre-Surgical Testing and was medically cleared to undergo elective procedure. Patient underwent right total knee replacement by Dr. Isai MD on 12/6/23  . Procedure was well tolerated.  No operative or anushka-operative complications arose during patient's hospital course.  Patient received antibiotic according to SCIP guidelines for infection prevention.  ASA 81mg PO Q12h was given for DVT prophylaxis, in addition to the use of SCDs.  Anesthesia, Medical Hospitalist, Physical Therapy and Occupational Therapy were consulted. Patient is stable for discharge with a good prognosis.  Appropriate discharge instructions and medications are provided in this document.   Patient is going home with home care (PT/OT/Skilled Nursing) This patient was admitted to [facility] with a history of severe degenerative joint disease of the right knee.  Patient underwent Pre-Surgical Testing and was medically cleared to undergo elective procedure. Patient underwent right total knee replacement by Dr. Isai MD on 12/6/23  . Procedure was well tolerated.  No operative or anushka-operative complications arose during patient's hospital course.  Patient received antibiotic according to SCIP guidelines for infection prevention.  ASA 81mg PO Q12h was given for DVT prophylaxis, in addition to the use of SCDs.  Anesthesia, Medical Hospitalist, Physical Therapy and Occupational Therapy were consulted. Patient is stable for discharge with a good prognosis.  Appropriate discharge instructions and medications are provided in this document.   Patient is going home with home care (PT/OT/Skilled Nursing) This patient was admitted to [facility] with a history of severe degenerative joint disease of the right knee.  Patient underwent Pre-Surgical Testing and was medically cleared to undergo elective procedure. Patient underwent right total knee replacement by Dr. Isai MD on 12/6/23  . Procedure was well tolerated.  No operative or anushka-operative complications arose during patient's hospital course.  Patient received antibiotic according to SCIP guidelines for infection prevention.  ASA 81mg PO Q12h was given for DVT prophylaxis, in addition to the use of SCDs.  Anesthesia, Medical Hospitalist, Physical Therapy and Occupational Therapy were consulted. Patient is stable for discharge with a good prognosis.  Appropriate discharge instructions and medications are provided in this document.    This patient was admitted to [facility] with a history of severe degenerative joint disease of the right knee.  Patient underwent Pre-Surgical Testing and was medically cleared to undergo elective procedure. Patient underwent right total knee replacement by Dr. Isai MD on 12/6/23  . Procedure was well tolerated.  No operative or anushka-operative complications arose during patient's hospital course.  Patient received antibiotic according to SCIP guidelines for infection prevention.  ASA 81mg PO Q12h was given for DVT prophylaxis, in addition to the use of SCDs.  Anesthesia, Medical Hospitalist, Physical Therapy and Occupational Therapy were consulted. Patient is stable for discharge with a good prognosis.  Appropriate discharge instructions and medications are provided in this document.

## 2023-12-06 NOTE — DISCHARGE NOTE PROVIDER - PROVIDER TOKENS
PROVIDER:[TOKEN:[[id number]],FOLLOWUP:[2 weeks]] PROVIDER:[TOKEN:[[id number]],FOLLOWUP:[2 weeks]] PROVIDER:[TOKEN:[[id number]],SCHEDULEDAPPT:[12/22/2023],SCHEDULEDAPPTTIME:[10:15 AM]]

## 2023-12-06 NOTE — ASU PREOP CHECKLIST - WARM FLUIDS/WARM BLANKETS
Lab called to report the following critical result: hemoglobin 6.1  Per MD, patient should go to ED for blood transfusion.     Via , left message for patient's daughter informing her of results and recommendation for patient to go to ED. Advised that she call back to confirm that the message was received.     Via , spoke with patient and informed him of results and recommendation for him to go to ED for blood transfusion. He expressed his agreement and understanding. He was made aware that a message was left informing his daughter of this information. He expressed his understanding.   
no

## 2023-12-06 NOTE — DISCHARGE NOTE PROVIDER - CARE PROVIDERS DIRECT ADDRESSES
[email or web address]

## 2023-12-06 NOTE — DISCHARGE NOTE PROVIDER - NSDCCPCAREPLAN_GEN_ALL_CORE_FT
PRINCIPAL DISCHARGE DIAGNOSIS  Diagnosis: Right knee DJD  Assessment and Plan of Treatment: Physical Therapy/Occupational Therapy for ambulation, transfers, stairs, ADL's, Range of Motion Exercises, Isometrics.  Full weight bearing as tolerated with rolling walker  Range of Motion Goals: Flexion 120 degrees; Extension 0 degrees  Keep incision clean and dry.  Suture/prineo dressing removal 14 days after surgery at rehab facility or Surgeon's office  May shower post-op day #5 if no drainage from incision     PRINCIPAL DISCHARGE DIAGNOSIS  Diagnosis: Right knee DJD  Assessment and Plan of Treatment: Physical Therapy/Occupational Therapy for ambulation, transfers, stairs, ADL's, Range of Motion Exercises, Isometrics.  Full weight bearing as tolerated with rolling walker  Range of Motion Goals: Flexion 120 degrees; Extension 0 degrees  Keep incision clean and dry.  Keep dressing clean, dry, and intact. Cover dressing with cast bag or double wrapped plastic for protection when showering.   May remove ACE wrap when out of hospital and do not need to reapply. Use flexible spandage to keep dressings in place for comfort.  May remove all dressings on POD#7.   May shower post-op day #7 if no drainage from incision and leave the area uncovered if not drainage. Pat incision dry after shower.  No creams/lotions/ointments near incision.  Do not submerge.   Suture dressing removal 14 days after surgery at rehab facility or Surgeon's office

## 2023-12-06 NOTE — DISCHARGE NOTE PROVIDER - NSDCFUSCHEDAPPT_GEN_ALL_CORE_FT
" EMERGENCY DEPARTMENT ENCOUNTER    CHIEF COMPLAINT  Chief Complaint: right foot pain  History given by: pt  History limited by: N/A  Room Number: 01/01  PMD: Mal Barboza MD      HPI:  Pt is a 76 y.o. male with a hx of Parkinson's disease who presents complaining of moderate right foot pain that radiates up the right leg started last night when the pt injured the foot during a mechanical fall. Pt denies any other symptoms, hitting his head, or LOC.    Duration/ Onset/ Timing: last night, gradual, constant  Location: right foot  Radiation: up the right leg  Quality: \"pain\"  Intensity/Severity: moderate  Progression: unchanged  Associated Symptoms: none  Previous Episodes: none    PAST MEDICAL HISTORY  Active Ambulatory Problems     Diagnosis Date Noted   • No Active Ambulatory Problems     Resolved Ambulatory Problems     Diagnosis Date Noted   • No Resolved Ambulatory Problems     Past Medical History:   Diagnosis Date   • Diabetes mellitus (CMS/HCC)    • Gout    • Hyperlipidemia    • Hypertension        PAST SURGICAL HISTORY  History reviewed. No pertinent surgical history.    FAMILY HISTORY  History reviewed. No pertinent family history.    SOCIAL HISTORY  Social History     Socioeconomic History   • Marital status:      Spouse name: Not on file   • Number of children: Not on file   • Years of education: Not on file   • Highest education level: Not on file   Tobacco Use   • Smoking status: Former Smoker   Substance and Sexual Activity   • Alcohol use: Yes     Alcohol/week: 1.8 oz     Types: 3 Shots of liquor per week     Comment: burbon   • Drug use: No       ALLERGIES  Patient has no known allergies.    REVIEW OF SYSTEMS  Review of Systems   Respiratory: Negative for shortness of breath.    Cardiovascular: Negative for chest pain.   Gastrointestinal: Negative for abdominal pain.   Musculoskeletal: Positive for myalgias (right foot pain that radiates up the right leg). Negative for back pain. "   Neurological: Negative for headaches.   All other systems reviewed and are negative.      PHYSICAL EXAM  ED Triage Vitals [11/08/19 0939]   Temp Heart Rate Resp BP SpO2   -- 90 16 -- 98 %      Temp src Heart Rate Source Patient Position BP Location FiO2 (%)   -- Monitor -- -- --       Physical Exam   Constitutional: No distress.   HENT:   Head: Normocephalic and atraumatic.   Eyes: EOM are normal.   Neck: Normal range of motion.   Cardiovascular: Intact distal pulses.   Pulmonary/Chest: No respiratory distress.   Abdominal: There is no tenderness.   Musculoskeletal: He exhibits no edema.   Swelling and bruising just above the ankle and throughout the foot. Increased pain with dorsiflexion and external rotation. Skin intact. Good sensation.  No knee or proximal tibial tenderness   Neurological: He is alert.   Skin: Skin is warm and dry.   Nursing note and vitals reviewed.        RADIOLOGY  XR Foot 3+ View Right   HISTORY: Fell. Pain.     FINDINGS: There is a fracture at the upper portion of the lateral  malleolus with separation at the fracture site measuring approximately 2  mm. No other fracture is seen.     This report was finalized on 11/8/2019 11:19 AM by Dr. Anuj Avila M.D.      XR Ankle 3+ View Right   3-VIEW RIGHT ANKLE AND 3-VIEW RIGHT FOOT     HISTORY: Fell. Pain.     FINDINGS: There is a fracture at the upper portion of the lateral  malleolus with separation at the fracture site measuring approximately 2  mm. No other fracture is seen.     This report was finalized on 11/8/2019 11:19 AM by Dr. Anuj Avila M.D.              I ordered the above noted radiological studies. Interpreted by radiologist. Reviewed by me in PACS.       PROGRESS AND CONSULTS       Medications - No data to display    0952  Discussed plan to do XRs. Pt understands and agrees with the plan. All questions have been answered.    1120  Discussed case with Dr Keith, Ortho  Reviewed history, exam, results and treatments.   Thad Alex  [facility].  Scheduled Appointment: 12/10/2023    Thad Alex  [facility]  [facility]UR [address] [place] [address]  Scheduled Appointment: 12/22/2023     Discussed concerns and plan of care. Dr Keith recommends doing a nonweight bearing cam boot.    1121  Rechecked patient who is resting comfortably. Discussed all test results. Discussed plan to put the pt in a Cam boot. Discussed that pt will still need to be nonweightbearing. Discussed plan to attempt to ambulate the pt with the boot before discharge. Pt understands and agrees with the plan. All questions have been answered.        MEDICAL DECISION MAKING  Results were reviewed/discussed with the patient and they were also made aware of online access. Pt also made aware that some labs, such as cultures, will not be resulted during ER visit and follow up with PMD is necessary.     MDM  Number of Diagnoses or Management Options     Amount and/or Complexity of Data Reviewed  Tests in the radiology section of CPT®: ordered and reviewed           DIAGNOSIS  Final diagnoses:   Other closed fracture of distal end of right fibula, initial encounter       DISPOSITION  DISCHARGE    Patient discharged in stable condition.    Reviewed implications of results, diagnosis, meds, responsibility to follow up, warning signs and symptoms of possible worsening, potential complications and reasons to return to ER.    Patient/Family voiced understanding of above instructions.    Discussed plan for discharge, as there is no emergent indication for admission. Patient referred to primary care provider for BP management due to today's BP. Pt/family is agreeable and understands need for follow up and repeat testing.  Pt is aware that discharge does not mean that nothing is wrong but it indicates no emergency is present that requires admission and they must continue care with follow-up as given below or physician of their choice.     FOLLOW-UP  Evgeny Keith II, MD  [address] [facility] [address]  [place] [id number] [phone]    Schedule an appointment as soon as possible for a visit   right fibula fracture         Medication List       New Prescriptions    HYDROcodone-acetaminophen 5-325 MG per tablet  Commonly known as:  NORCO  Take 1 tablet by mouth Every 6 (Six) Hours As Needed for Moderate Pain .              Latest Documented Vital Signs:  As of 11:47 AM  BP- 175/78 HR- 88 Temp- 98.4 °F (36.9 °C) (Oral) O2 sat- 98%    --  Documentation assistance provided by nicolasa Rowland for Dr Yao.  Information recorded by the scribe was done at my direction and has been verified and validated by me.         Larissa Rowland  11/08/19 5030       Tono Yao MD  11/08/19 8965     Thad Alex  [facility]Admits.  Scheduled Appointment: 12/10/2023    Thad Alex  [facility]  [facility] [address] [place] [address]  Scheduled Appointment: 12/22/2023

## 2023-12-06 NOTE — PHYSICAL THERAPY INITIAL EVALUATION ADULT - PERTINENT HX OF CURRENT PROBLEM, REHAB EVAL
Pt is a 69 y/o female with right knee primary OA. Pt is s/p right total knee replacement on 12/6/23.

## 2023-12-06 NOTE — CONSULT NOTE ADULT - ASSESSMENT
68F with HTN, HLD, and R knee OA s/p R TKR     POD#0 R TKR   Multimodal analgesia   Bowel regimen   PT/OT evaluations   VTE ppx    Incentive spirometry    Obtain basic labs     HTN   BP stable 133/71mmHg  Continue norvasc 2.5mg qd (took it this morning)    HLD   Resume atorvastatin 20mg HS on dc

## 2023-12-06 NOTE — PHYSICAL THERAPY INITIAL EVALUATION ADULT - GAIT TRAINING, PT EVAL
wrist pain/injury Patient will ambulate 150 feet independently with a rolling walker within 1-2 days for safe community ambulation. Patient will ascend/descend 8 stairs independently with +HR and straight cane within 1-2 days for safe household stair negotiation.

## 2023-12-06 NOTE — DISCHARGE NOTE PROVIDER - NSDCCPTREATMENT_GEN_ALL_CORE_FT
PRINCIPAL PROCEDURE  Procedure: Right total knee replacement  Findings and Treatment:      PRINCIPAL PROCEDURE  Procedure: Right total knee replacement  Findings and Treatment: Severe DJD right knee.

## 2023-12-06 NOTE — CONSULT NOTE ADULT - SUBJECTIVE AND OBJECTIVE BOX
68F with HTN, HLD and R knee OA admitted for R TKR   The patient was seen postoperatively on the general medical gonzalez  Reported 2/10 pain at rest  Tolerating orals  Denied CP or SOB    REVIEW OF SYSTEMS:  CONSTITUTIONAL: No fever, weight loss, or fatigue    EYES: No eye pain, visual disturbances, or discharge    ENMT:  No difficulty hearing, tinnitus, vertigo; No sinus or throat pain    NECK: No pain or stiffness    RESPIRATORY: No cough, wheezing, chills or hemoptysis; No shortness of breath    CARDIOVASCULAR: No chest pain, palpitations, dizziness, or leg swelling    GASTROINTESTINAL: No abdominal or epigastric pain. No nausea, vomiting, or hematemesis; No diarrhea or constipation. No melena or hematochezia.    NEUROLOGICAL: No headaches, memory loss, loss of strength, numbness, or tremors    SKIN: No itching, burning, rashes, or lesions     LYMPH NODES: No enlarged glands    ENDOCRINE: No heat or cold intolerance; No hair loss    PSYCHIATRIC: No depression, anxiety, mood swings, or difficulty sleeping    HEME/LYMPH: No easy bruising, or bleeding gums    ALLERGY AND IMMUNOLOGIC: No hives or eczema       PAST MEDICAL & SURGICAL HISTORY:  HTN (hypertension)      HLD (hyperlipidemia)      Osteoarthritis of right knee          SOCIAL HISTORY:  Residence: [ ] [facility]  [ ] [facility]  [ ] Community  [ ] Substance abuse:   [ ] Tobacco:   [ ] Alcohol use:     Allergies    No Known Allergies    Intolerances        MEDICATIONS  (STANDING):  acetaminophen   IVPB .. 1000 milliGRAM(s) IV Intermittent every 6 hours  atorvastatin 20 milliGRAM(s) Oral at bedtime  ceFAZolin   IVPB 2000 milliGRAM(s) IV Intermittent every 8 hours  celecoxib 200 milliGRAM(s) Oral every 12 hours  lactated ringers. 1000 milliLiter(s) (100 mL/Hr) IV Continuous <Continuous>  pantoprazole    Tablet 40 milliGRAM(s) Oral before breakfast  polyethylene glycol 3350 17 Gram(s) Oral at bedtime  senna 2 Tablet(s) Oral at bedtime    MEDICATIONS  (PRN):  HYDROmorphone  Injectable 0.5 milliGRAM(s) IV Push every 3 hours PRN breakthrough pain  magnesium hydroxide Suspension 30 milliLiter(s) Oral daily PRN Constipation  ondansetron Injectable 4 milliGRAM(s) IV Push every 6 hours PRN Nausea and/or Vomiting  oxyCODONE    IR 5 milliGRAM(s) Oral every 3 hours PRN Moderate Pain (4 - 6)  oxyCODONE    IR 10 milliGRAM(s) Oral every 3 hours PRN Severe Pain (7 - 10)      FAMILY HISTORY:      Vital Signs Last 24 Hrs  T(C): 36.3 (06 Dec 2023 17:00), Max: 36.4 (06 Dec 2023 14:55)  T(F): 97.4 (06 Dec 2023 17:00), Max: 97.5 (06 Dec 2023 14:55)  HR: 83 (06 Dec 2023 17:00) (80 - 90)  BP: 133/71 (06 Dec 2023 17:00) (107/67 - 163/72)  BP(mean): --  RR: 12 (06 Dec 2023 17:00) (10 - 21)  SpO2: 99% (06 Dec 2023 17:00) (96% - 100%)    Parameters below as of 06 Dec 2023 16:15  Patient On (Oxygen Delivery Method): room air        PHYSICAL EXAM:  GENERAL: NAD  HEAD:  Atraumatic, Normocephalic    EYES: EOMI, PERRLA, conjunctiva and sclera clear    NERVOUS SYSTEM:  Alert & Oriented X3, Good concentration; Moving all 4 extremities; No gross sensory deficits    CHEST/LUNG: Clear to auscultation bilaterally; No rales, rhonchi, wheezing, or rubs    HEART: Regular rate and rhythm; No murmurs, rubs, or gallops    ABDOMEN: Soft, Nontender, Nondistended; Bowel sounds present    EXTREMITIES:  2+ Peripheral Pulses, No clubbing, cyanosis, or edema    INCISION R knee dressing clean and dry, neurovascularly intact     LABS:              CAPILLARY BLOOD GLUCOSE          RADIOLOGY & ADDITIONAL STUDIES:    EKG:   Personally Reviewed:  [ ] YES     Imaging:   Personally Reviewed:  [ ] YES     Consultant(s) Notes Reviewed:      Care Discussed with Consultants/Other Providers:                68F with HTN, HLD and R knee OA admitted for R TKR   The patient was seen postoperatively on the general medical gonzalez  Reported 2/10 pain at rest  Tolerating orals  Denied CP or SOB    REVIEW OF SYSTEMS:  CONSTITUTIONAL: No fever, weight loss, or fatigue    EYES: No eye pain, visual disturbances, or discharge    ENMT:  No difficulty hearing, tinnitus, vertigo; No sinus or throat pain    NECK: No pain or stiffness    RESPIRATORY: No cough, wheezing, chills or hemoptysis; No shortness of breath    CARDIOVASCULAR: No chest pain, palpitations, dizziness, or leg swelling    GASTROINTESTINAL: No abdominal or epigastric pain. No nausea, vomiting, or hematemesis; No diarrhea or constipation. No melena or hematochezia.    NEUROLOGICAL: No headaches, memory loss, loss of strength, numbness, or tremors    SKIN: No itching, burning, rashes, or lesions     LYMPH NODES: No enlarged glands    ENDOCRINE: No heat or cold intolerance; No hair loss    PSYCHIATRIC: No depression, anxiety, mood swings, or difficulty sleeping    HEME/LYMPH: No easy bruising, or bleeding gums    ALLERGY AND IMMUNOLOGIC: No hives or eczema       PAST MEDICAL & SURGICAL HISTORY:  HTN (hypertension)      HLD (hyperlipidemia)      Osteoarthritis of right knee          SOCIAL HISTORY:  Residence: [ ] [facility]  [ ] [facility]  [ ] Community  [ ] Substance abuse:   [ ] Tobacco:   [ ] Alcohol use:     Allergies    No Known Allergies    Intolerances        MEDICATIONS  (STANDING):  acetaminophen   IVPB .. 1000 milliGRAM(s) IV Intermittent every 6 hours  atorvastatin 20 milliGRAM(s) Oral at bedtime  ceFAZolin   IVPB 2000 milliGRAM(s) IV Intermittent every 8 hours  celecoxib 200 milliGRAM(s) Oral every 12 hours  lactated ringers. 1000 milliLiter(s) (100 mL/Hr) IV Continuous <Continuous>  pantoprazole    Tablet 40 milliGRAM(s) Oral before breakfast  polyethylene glycol 3350 17 Gram(s) Oral at bedtime  senna 2 Tablet(s) Oral at bedtime    MEDICATIONS  (PRN):  HYDROmorphone  Injectable 0.5 milliGRAM(s) IV Push every 3 hours PRN breakthrough pain  magnesium hydroxide Suspension 30 milliLiter(s) Oral daily PRN Constipation  ondansetron Injectable 4 milliGRAM(s) IV Push every 6 hours PRN Nausea and/or Vomiting  oxyCODONE    IR 5 milliGRAM(s) Oral every 3 hours PRN Moderate Pain (4 - 6)  oxyCODONE    IR 10 milliGRAM(s) Oral every 3 hours PRN Severe Pain (7 - 10)      FAMILY HISTORY:      Vital Signs Last 24 Hrs  T(C): 36.3 (06 Dec 2023 17:00), Max: 36.4 (06 Dec 2023 14:55)  T(F): 97.4 (06 Dec 2023 17:00), Max: 97.5 (06 Dec 2023 14:55)  HR: 83 (06 Dec 2023 17:00) (80 - 90)  BP: 133/71 (06 Dec 2023 17:00) (107/67 - 163/72)  BP(mean): --  RR: 12 (06 Dec 2023 17:00) (10 - 21)  SpO2: 99% (06 Dec 2023 17:00) (96% - 100%)    Parameters below as of 06 Dec 2023 16:15  Patient On (Oxygen Delivery Method): room air        PHYSICAL EXAM:  GENERAL: NAD  HEAD:  Atraumatic, Normocephalic    EYES: EOMI, PERRLA, conjunctiva and sclera clear    NERVOUS SYSTEM:  Alert & Oriented X3, Good concentration; Moving all 4 extremities; No gross sensory deficits    CHEST/LUNG: Clear to auscultation bilaterally; No rales, rhonchi, wheezing, or rubs    HEART: Regular rate and rhythm; No murmurs, rubs, or gallops    ABDOMEN: Soft, Nontender, Nondistended; Bowel sounds present    EXTREMITIES:  2+ Peripheral Pulses, No clubbing, cyanosis, or edema    INCISION R knee dressing clean and dry, neurovascularly intact     LABS:              CAPILLARY BLOOD GLUCOSE          RADIOLOGY & ADDITIONAL STUDIES:    EKG:   Personally Reviewed:  [ ] YES     Imaging:   Personally Reviewed:  [ ] YES     Consultant(s) Notes Reviewed:      Care Discussed with Consultants/Other Providers:

## 2023-12-06 NOTE — ASU PATIENT PROFILE, ADULT - FALL HARM RISK - UNIVERSAL INTERVENTIONS
Bed in lowest position, wheels locked, appropriate side rails in place/Call bell, personal items and telephone in reach/Instruct patient to call for assistance before getting out of bed or chair/Non-slip footwear when patient is out of bed/[place] to call system/Physically safe environment - no spills, clutter or unnecessary equipment/Purposeful Proactive Rounding/Room/bathroom lighting operational, light cord in reach Bed in lowest position, wheels locked, appropriate side rails in place/Call bell, personal items and telephone in reach/Instruct patient to call for assistance before getting out of bed or chair/Non-slip footwear when patient is out of bed/[place] to call system/Physically safe environment - no spills, clutter or unnecessary equipment/Purposeful Proactive Rounding/Room/bathroom lighting operational, light cord in reach

## 2023-12-06 NOTE — PHYSICAL THERAPY INITIAL EVALUATION ADULT - ADDITIONAL COMMENTS
Pt lives with her niece in a private house, there are 8 stairs +HR to enter and no stairs to negotiate within. Pt has a walk in shower. PTA pt was independent with ADLs and ambulation. Pt owns a RW and commode. Pt reports her niece is unable to assist her when she returns home due to her own medical condition, pt is unsure at this time who will be able to provide assistance upon dc.

## 2023-12-06 NOTE — DISCHARGE NOTE PROVIDER - REASON FOR ADMISSION
Detail Level: Detailed Field Number: A Dimensions-X Axis In Cm: 1.5 Dimensions-Y Axis In Cm: 3 Treatment Margins In Cm: 0.8 Render Additional Prescriptions In Note?: No Render Prescriptions In Note?: Yes Functional Status: 0 (fully active) Port Dimensions-X Axis In Cm: 0 Field Size (Applicator): 5.0 cm Patient Positioning: Sitting Custom Shielding Preamble Text Will Not Be Included With Simple Simulations (.......... X X Y Cm): A lead shield of 0.762 mm thickness is utilized to form a molded, custom shield with a Custom Shielding Afterword Text Will Not Be Included With Simple Simulations (X X Y Cm............): port to correlate with the lesion size, including treatment margin. The custom lead shield is adequate to accommodate the appropriate applicator and provide adequate shielding around the treatment site. Additional shielding (as noted below) is used to protect sensitive, normal tissues. Initial Radiation Treatment Planning (Will Render If Bill Simulation = Yes): The patient had a complete consultation regarding all applicable modalities for the treatment of their skin cancer and based on a variety of factors including the type of tumor, size, and location, the relevant medical history as well as local tissue factors, the functional status of the individual, the ability to perform necessary postoperative wound instructions and the need for simultaneous treatments as well as overall wound healing status, it was determined that the patient would begin radiation therapy treatment for skin cancer.  A full simulation and treatment device design was performed including the determination and formulation of appropriate simple and complex devices including lead shield of 0.762 mm thickness to form molded customized shielding to specifically correlate with the lesion size including treatment margin.  The custom lead shield is adequate to accommodate the appropriate applicator and provide adequate shielding around the treatment site.  The specific field applicator, shields, and devices both simple and complex as well as the specific patient setup is outlined below.  The patient was given a full consent for superficial radiation to both verbally and in writing and the full determination of patient's eligibility for treatment and selection is outlined on the patient eligibility and treatment selection form.  The specific superficial radiotherapy prescription was determined and was documented on the superficial radiotherapy prescription form.  A treatment calculation was also performed and documented on the treatment calculation form.  Based on the prescription, the patient was scheduled for a series of fractional treatments. Simple Simulation Preamble Text Will Be Included With Simple Simulations (.......... Indications): Simple simulation was performed today for the following reasons: Simple Simulation Afterword Text Will Be Included With Simple Simulations (Indications............): The patient had a complete consultation regarding all applicable modalities for the treatment of their skin cancer and based on a variety of factors including the type of tumor, size, and location, the relevant medical history as well as local tissue factors, the functional status of the individual, the ability to perform necessary postoperative wound instructions and the need for simultaneous treatments as well as overall wound healing status, it was determined that the patient would begin radiation therapy treatment for skin cancer.  A full simulation and treatment device design was performed including the determination and formulation of appropriate simple and complex devices including lead shield of 0.762 mm thickness to form molded customized shielding to specifically correlate with the lesion size including treatment margin.  The custom lead shield is adequate to accommodate the appropriate applicator and provide adequate shielding around the treatment site.  The specific field applicator, shields, and devices both simple and complex as well as the specific patient setup is outlined below.  The patient was given a full consent for superficial radiation to both verbally and in writing and the full determination of patient's eligibility for treatment and selection is outlined on the patient eligibility and treatment selection form.  The specific superficial radiotherapy prescription was determined and was documented on the superficial radiotherapy prescription form.  A treatment calculation was also performed and documented on the treatment calculation form.  Based on the prescription, the patient was scheduled for a series of fractional treatments. Treatment Device Design After Initial Simulation Justification (Will Render If Bill For Treatment Devices = Yes): The patient is status post radiation simulation and is evaluated as to the use of additional devices for shielding and placement for radiation therapy. Additional Prescription Justification Text: If there is any interruption in treatment exceeding 5 days please see Decay and Dose Adjustment Calculation and complete treatment under Prescription 2, 3 or 4 as appropriate. Field Size (Applicator): 2.0 cm Shielding Size (Optional- Include Units): 5 x 3.3 Energy (Optional-Please Include Units): 50 kVu Fractions / Week: 2 Time Dose Fractionation (Optional- Include Units If Applicable): 100 Daily Fractionated Dose (Optional- Include Units): 324.5 Treatment Time / Fractionation (Optional- Include Units): .38 Total Number Of Fractions: 17 Total Dose (Optional-Please Include Units): 5516.5 Fractions / Week Rx 2: 5 Total Number Of Fractions Rx 2: 15 Day Of The Week Treatment Administered: Monday Fractionation Number: 14 Prescription Used: 1 Cumulative Dose In Cgy (Optional): 6675 Fractionation Number (Evaluation): 13 Assessment: Appropriate reaction Intro Statement (Will Not Render If Left Blank): The patient is undergoing superficial radiation therapy for skin cancer and presents for weekly evaluation and management.  Per protocol and as documented on the flow sheet, the patient was questioned as to subjective redness, pruritus, pain, drainage, fatigue, or any other symptoms.  Objectively, the radiation area was evaluated with regards to erythema, atrophy, scale, crusting, erosion, ulceration, edema, purpura, tenderness, warmth, drainage, and any other findings.  The plan was extensively reviewed including the dose, and dosing schedule.  The simulation and clinical setup was also reviewed as was the external and any internal shields and based on this review the appropriateness and sufficiency of treatment was determined. Information: Selecting Yes will display possible errors in your note based on the variables you have selected. This validation is only offered as a suggestion for you. PLEASE NOTE THAT THE VALIDATION TEXT WILL BE REMOVED WHEN YOU FINALIZE YOUR NOTE. IF YOU WANT TO FAX A PRELIMINARY NOTE YOU WILL NEED TO TOGGLE THIS TO 'NO' IF YOU DO NOT WANT IT IN YOUR FAXED NOTE. right knee djd severe   Right total knee replacement

## 2023-12-06 NOTE — DISCHARGE NOTE PROVIDER - CARE PROVIDER_API CALL
Thad Alex  Orthopaedic Surgery  [address], [address]  [place] [address]  Phone: [phone]  Fax: [phone]  Follow Up Time: 2 weeks   Thad Alex  Orthopaedic Surgery  [address], [address]  [place] [address]  Phone: [phone]  Fax: [phone]  Follow Up Time: 2 weeks   Thad Alex  Orthopaedic Surgery  [address], [address]  [place] [address]  Phone: [phone]  Fax: [phone]  Scheduled Appointment: 12/22/2023 10:15 AM   Thad Alex  Orthopaedic Surgery  [address], [address]  [place] [address]  Phone: [phone]  Fax: [phone]  Scheduled Appointment: 12/22/2023 10:15 AM

## 2023-12-06 NOTE — DISCHARGE NOTE PROVIDER - NSDCFUADDAPPT_GEN_ALL_CORE_FT
- Call your doctor if you experience:  • An increase in pain not controlled by pain medication or change in activity or  position.  • Temperature greater than 101° F.  • Redness, increased swelling or foul smelling drainage from or around the  incision.  • Numbness, tingling or a change in color or temperature of the operative leg.  • Call your doctor immediately if you experience chest pain, shortness of breath or calf pain.  It is advisable to follow up with your primary care provider within the next 2-3 weeks to ensure your medications are appropriate and there are no underlying problems after your procedure.

## 2023-12-06 NOTE — DISCHARGE NOTE PROVIDER - NSDCMRMEDTOKEN_GEN_ALL_CORE_FT
amLODIPine 2.5 mg oral tablet: 1 tab(s) orally once a day  atorvastatin 20 mg oral tablet: 1 tab(s) orally once a day (at bedtime)  diclofenac sodium 75 mg oral delayed release tablet: 1 tab(s) orally prn  mupirocin 2% topical ointment: 1 application in each nostril 2 times a day   acetaminophen 500 mg oral tablet: 2 tab(s) orally every 8 hours  amLODIPine 2.5 mg oral tablet: 1 tab(s) orally once a day  aspirin 81 mg oral delayed release tablet: 1 tab(s) orally every 12 hours Take aspirin 2 hours before Celebrex/Meloxicam  atorvastatin 20 mg oral tablet: 1 tab(s) orally once a day (at bedtime)  CeleBREX 200 mg oral capsule: 1 cap(s) orally every 12 hours Take Celebrex 2 hours after aspirin  omeprazole 20 mg oral delayed release capsule: 1 cap(s) orally once a day  oxyCODONE 5 mg oral tablet: 1 tab(s) orally every 4 hours as needed for  moderate pain may take 2 tabs, as needed, for Severe Pain. Do not exceed max daily dose of 6 tabs. Do not combine with other sedating medications. [facility] : [phone] MDD: 6   acetaminophen 500 mg oral tablet: 2 tab(s) orally every 8 hours  amLODIPine 2.5 mg oral tablet: 1 tab(s) orally once a day  aspirin 81 mg oral delayed release tablet: 1 tab(s) orally every 12 hours Take aspirin 2 hours before Celebrex/Meloxicam  atorvastatin 20 mg oral tablet: 1 tab(s) orally once a day (at bedtime)  CeleBREX 200 mg oral capsule: 1 cap(s) orally every 12 hours Take Celebrex 2 hours after aspirin  omeprazole 20 mg oral delayed release capsule: 1 cap(s) orally once a day  oxyCODONE 5 mg oral tablet: 1 tab(s) orally every 4 hours as needed for  moderate pain may take 2 tabs, as needed, for Severe Pain. Do not exceed max daily dose of 6 tabs. Do not combine with other sedating medications. [facility] : [phone] MDD: 6   acetaminophen 500 mg oral tablet: 2 tab(s) orally every 8 hours  amLODIPine 2.5 mg oral tablet: 1 tab(s) orally once a day  aspirin 81 mg oral delayed release tablet: 1 tab(s) orally every 12 hours Take 2 hours before celebrex  atorvastatin 20 mg oral tablet: 1 tab(s) orally once a day (at bedtime)  celecoxib 200 mg oral capsule: 1 cap(s) orally every 12 hours  oxyCODONE 10 mg oral tablet: 1 tab(s) orally every 3 hours As needed Severe Pain (7 - 10)  oxyCODONE 5 mg oral tablet: 1 tab(s) orally every 3 hours as needed for Moderate Pain (4 - 6)  pantoprazole 40 mg oral delayed release tablet: 1 tab(s) orally once a day (before a meal)

## 2023-12-07 LAB
ANION GAP SERPL CALC-SCNC: 6 MMOL/L — SIGNIFICANT CHANGE UP (ref 5–17)
ANION GAP SERPL CALC-SCNC: 6 MMOL/L — SIGNIFICANT CHANGE UP (ref 5–17)
BUN SERPL-MCNC: 15 MG/DL — SIGNIFICANT CHANGE UP (ref 7–23)
BUN SERPL-MCNC: 15 MG/DL — SIGNIFICANT CHANGE UP (ref 7–23)
CALCIUM SERPL-MCNC: 8.9 MG/DL — SIGNIFICANT CHANGE UP (ref 8.4–10.5)
CALCIUM SERPL-MCNC: 8.9 MG/DL — SIGNIFICANT CHANGE UP (ref 8.4–10.5)
CHLORIDE SERPL-SCNC: 105 MMOL/L — SIGNIFICANT CHANGE UP (ref 96–108)
CHLORIDE SERPL-SCNC: 105 MMOL/L — SIGNIFICANT CHANGE UP (ref 96–108)
CO2 SERPL-SCNC: 28 MMOL/L — SIGNIFICANT CHANGE UP (ref 22–31)
CO2 SERPL-SCNC: 28 MMOL/L — SIGNIFICANT CHANGE UP (ref 22–31)
CREAT SERPL-MCNC: 0.6 MG/DL — SIGNIFICANT CHANGE UP (ref 0.5–1.3)
CREAT SERPL-MCNC: 0.6 MG/DL — SIGNIFICANT CHANGE UP (ref 0.5–1.3)
EGFR: 97 ML/MIN/1.73M2 — SIGNIFICANT CHANGE UP
EGFR: 97 ML/MIN/1.73M2 — SIGNIFICANT CHANGE UP
GLUCOSE SERPL-MCNC: 143 MG/DL — HIGH (ref 70–99)
GLUCOSE SERPL-MCNC: 143 MG/DL — HIGH (ref 70–99)
HCT VFR BLD CALC: 30.9 % — LOW (ref 34.5–45)
HCT VFR BLD CALC: 30.9 % — LOW (ref 34.5–45)
HGB BLD-MCNC: 10.1 G/DL — LOW (ref 11.5–15.5)
HGB BLD-MCNC: 10.1 G/DL — LOW (ref 11.5–15.5)
MCHC RBC-ENTMCNC: 29.1 PG — SIGNIFICANT CHANGE UP (ref 27–34)
MCHC RBC-ENTMCNC: 29.1 PG — SIGNIFICANT CHANGE UP (ref 27–34)
MCHC RBC-ENTMCNC: 32.7 GM/DL — SIGNIFICANT CHANGE UP (ref 32–36)
MCHC RBC-ENTMCNC: 32.7 GM/DL — SIGNIFICANT CHANGE UP (ref 32–36)
MCV RBC AUTO: 89 FL — SIGNIFICANT CHANGE UP (ref 80–100)
MCV RBC AUTO: 89 FL — SIGNIFICANT CHANGE UP (ref 80–100)
NRBC # BLD: 0 /100 WBCS — SIGNIFICANT CHANGE UP (ref 0–0)
NRBC # BLD: 0 /100 WBCS — SIGNIFICANT CHANGE UP (ref 0–0)
PLATELET # BLD AUTO: 255 K/UL — SIGNIFICANT CHANGE UP (ref 150–400)
PLATELET # BLD AUTO: 255 K/UL — SIGNIFICANT CHANGE UP (ref 150–400)
POTASSIUM SERPL-MCNC: 4.3 MMOL/L — SIGNIFICANT CHANGE UP (ref 3.5–5.3)
POTASSIUM SERPL-MCNC: 4.3 MMOL/L — SIGNIFICANT CHANGE UP (ref 3.5–5.3)
POTASSIUM SERPL-SCNC: 4.3 MMOL/L — SIGNIFICANT CHANGE UP (ref 3.5–5.3)
POTASSIUM SERPL-SCNC: 4.3 MMOL/L — SIGNIFICANT CHANGE UP (ref 3.5–5.3)
RBC # BLD: 3.47 M/UL — LOW (ref 3.8–5.2)
RBC # BLD: 3.47 M/UL — LOW (ref 3.8–5.2)
RBC # FLD: 13 % — SIGNIFICANT CHANGE UP (ref 10.3–14.5)
RBC # FLD: 13 % — SIGNIFICANT CHANGE UP (ref 10.3–14.5)
SODIUM SERPL-SCNC: 139 MMOL/L — SIGNIFICANT CHANGE UP (ref 135–145)
SODIUM SERPL-SCNC: 139 MMOL/L — SIGNIFICANT CHANGE UP (ref 135–145)
WBC # BLD: 19.18 K/UL — HIGH (ref 3.8–10.5)
WBC # BLD: 19.18 K/UL — HIGH (ref 3.8–10.5)
WBC # FLD AUTO: 19.18 K/UL — HIGH (ref 3.8–10.5)
WBC # FLD AUTO: 19.18 K/UL — HIGH (ref 3.8–10.5)

## 2023-12-07 PROCEDURE — 99233 SBSQ HOSP IP/OBS HIGH 50: CPT

## 2023-12-07 RX ORDER — OXYCODONE HYDROCHLORIDE 5 MG/1
1 TABLET ORAL
Qty: 42 | Refills: 0
Start: 2023-12-07 | End: 2023-12-13

## 2023-12-07 RX ADMIN — Medication 1000 MILLIGRAM(S): at 21:06

## 2023-12-07 RX ADMIN — Medication 1000 MILLIGRAM(S): at 13:28

## 2023-12-07 RX ADMIN — CELECOXIB 200 MILLIGRAM(S): 200 CAPSULE ORAL at 09:53

## 2023-12-07 RX ADMIN — Medication 1000 MILLIGRAM(S): at 05:35

## 2023-12-07 RX ADMIN — Medication 100 MILLIGRAM(S): at 03:52

## 2023-12-07 RX ADMIN — Medication 101.6 MILLIGRAM(S): at 05:36

## 2023-12-07 RX ADMIN — SENNA PLUS 2 TABLET(S): 8.6 TABLET ORAL at 21:04

## 2023-12-07 RX ADMIN — Medication 81 MILLIGRAM(S): at 05:35

## 2023-12-07 RX ADMIN — OXYCODONE HYDROCHLORIDE 10 MILLIGRAM(S): 5 TABLET ORAL at 01:00

## 2023-12-07 RX ADMIN — Medication 1000 MILLIGRAM(S): at 06:36

## 2023-12-07 RX ADMIN — ATORVASTATIN CALCIUM 20 MILLIGRAM(S): 80 TABLET, FILM COATED ORAL at 21:04

## 2023-12-07 RX ADMIN — OXYCODONE HYDROCHLORIDE 10 MILLIGRAM(S): 5 TABLET ORAL at 00:18

## 2023-12-07 RX ADMIN — Medication 400 MILLIGRAM(S): at 00:18

## 2023-12-07 RX ADMIN — SODIUM CHLORIDE 100 MILLILITER(S): 9 INJECTION, SOLUTION INTRAVENOUS at 05:36

## 2023-12-07 RX ADMIN — PANTOPRAZOLE SODIUM 40 MILLIGRAM(S): 20 TABLET, DELAYED RELEASE ORAL at 05:35

## 2023-12-07 RX ADMIN — Medication 1000 MILLIGRAM(S): at 00:46

## 2023-12-07 RX ADMIN — Medication 1000 MILLIGRAM(S): at 13:26

## 2023-12-07 RX ADMIN — CELECOXIB 200 MILLIGRAM(S): 200 CAPSULE ORAL at 21:07

## 2023-12-07 RX ADMIN — Medication 1000 MILLIGRAM(S): at 21:04

## 2023-12-07 RX ADMIN — Medication 81 MILLIGRAM(S): at 17:17

## 2023-12-07 RX ADMIN — CELECOXIB 200 MILLIGRAM(S): 200 CAPSULE ORAL at 21:04

## 2023-12-07 NOTE — CARE COORDINATION ASSESSMENT. - NSPASTMEDSURGHISTORY_GEN_ALL_CORE_FT
PAST MEDICAL & SURGICAL HISTORY:  Osteoarthritis of right knee      HLD (hyperlipidemia)      HTN (hypertension)

## 2023-12-07 NOTE — OCCUPATIONAL THERAPY INITIAL EVALUATION ADULT - ADDITIONAL COMMENTS
Pt lives with her niece in a private house, there are 8 stairs +HR to enter and no stairs to negotiate within. Pt has a walk in shower.  Pt owns a RW and commode. Pt reports her niece is unable to assist her when she returns home due to her own medical condition, pt is unsure at this time who will be able to provide assistance upon dc.

## 2023-12-07 NOTE — CARE COORDINATION ASSESSMENT. - NSCAREPROVIDERS_GEN_ALL_CORE_FT
CARE PROVIDERS:  Administration: Hyacinth Franks  Administration: Dalia Dunne  Administration: Saurabh Ferro  Admitting: Thad Alex  Attending: Thad Alex  Consultant: Eduardo Lagunas  Covering Team: AMBER Carvalho  Nurse: Park Nuno  Nurse: Tara Hanna  Ordered: Physician, Ordering  Override: Hermelinda Mills  Override: Helen Rose  PCA/Nursing Assistant: Myla Jenkins  PCA/Nursing Assistant: Cynthia Sebastian  Physical Therapy: Forest Wiley  Physical Therapy: Awilda Wheatley  Primary Team: Jere Martinez  Primary Team: Shimon Crawford  Primary Team: Helena Muller  Primary Team: Rajan Marquez  Primary Team: Nestor Acosta  Primary Team: Leda Shepard  Primary Team: Zina Wheatley  Registered Dietitian: Aliya Rust  : Melanie Her  Team: [facility] Hospitalists, Team   CARE PROVIDERS:  Administration: Hyacinth Franks  Administration: Dalia Dunne  Administration: Saurabh Ferro  Admitting: Thad Alex  Attending: Thad Alex  Consultant: Eduardo Lagunas  Covering Team: AMBER Carvalho  Nurse: Park Nuno  Nurse: Tara Hanna  Ordered: Physician, Ordering  Override: Hermelinda Mills  Override: Helen Rose  PCA/Nursing Assistant: Myla Jenkins  PCA/Nursing Assistant: Cynthia Sebastian  Physical Therapy: Forest Wiley  Physical Therapy: Awilda Wheatley  Primary Team: Jere Martinez  Primary Team: Shimon Crawford  Primary Team: Helena Muller  Primary Team: Rajan Marquez  Primary Team: Nestor Acosta  Primary Team: Leda Shepard  Primary Team: Zina Wheatley  Registered Dietitian: Aliya Rust  : Melanie Her  Team: [facility] Hospitalists, Team

## 2023-12-07 NOTE — CONSULT NOTE ADULT - ASSESSMENT
68F with HTN, HLD, and R knee OA POD#1 R TKR with post op nausea and dizziness    1. Lightheadedness   Likely due to volume loss perioperatively   Hemoglobin and hemodynamics stable. Surgical site appears clean and dry, low suspicion for PE or infection  Hold norvasc, start intravenous fluids NS 100ml/hr, lower opiates to minimize nausea, PRN zofran   If symptoms persist, plan to reorder CBC to assess Hg this afternoon ~2PM   Discussed with RN     2. POD#2 R TKR   Multimodal analgesia   Bowel regimen   PT/OT evaluations   VTE ppx    Incentive spirometry    Hold PT eval for now until sx improve     3. HTN   Hold norvasc     4. HLD   Resume atorvastatin 20mg HS on dc 68F with HTN, HLD, and R knee OA POD#1 R TKR with post op nausea and dizziness    1. Lightheadedness   Likely due to volume loss perioperatively   Hemoglobin and hemodynamics stable. Surgical site appears clean and dry, low suspicion for PE or infection  Hold norvasc, start intravenous fluids NS 100ml/hr, lower opiates to minimize nausea, PRN zofran   If symptoms persist, plan to reorder CBC to assess Hg this afternoon ~2PM   Discussed with RN     2. POD#2 R TKR   Multimodal analgesia   Bowel regimen   PT/OT evaluations   VTE ppx    Incentive spirometry    Hold PT eval for now until sx improve     3. Leucocytosis, 19K  Likely reactive post op, low suspicion for infection at this time    4. HTN   Hold norvasc     5. HLD   Resume atorvastatin 20mg HS on dc 68F with HTN, HLD, and R knee OA POD#1 R TKR with post op nausea and dizziness    1. Lightheadedness   Likely due to volume loss perioperatively   Hemoglobin and hemodynamics stable. Surgical site appears clean and dry, low suspicion for PE or infection  Hold norvasc, start intravenous fluids NS 100ml/hr, lower opiates to minimize nausea, PRN zofran   If symptoms persist, plan to reorder CBC to assess Hg this afternoon ~2PM   Discussed with RN     2. POD#1 R TKR   Multimodal analgesia   Bowel regimen   PT/OT evaluations   VTE ppx    Incentive spirometry    Hold PT eval for now until sx improve     3. Leucocytosis, 19K  Likely reactive post op, low suspicion for infection at this time    4. HTN   Hold norvasc     5. HLD   Resume atorvastatin 20mg HS on dc

## 2023-12-07 NOTE — OCCUPATIONAL THERAPY INITIAL EVALUATION ADULT - LEVEL OF INDEPENDENCE: SIT/STAND, REHAB EVAL
Dear Team Member,    Per your recent telephone screening with Employee Health:    Your lab appointment was scheduled during today's call.    You are to remain off work and isolate yourself from  public places except to seek medical care    Complete the symptom tracker daily on [employer] Compan[facility] to comply with your actively monitoring status    YOU MAY NOT RETURN TO WORK WITHOUT CLEARANCE FROM EMPLOYEE HEALTH.  It is not necessary to call Employee Health.  The Central COVID Team will contact you at the appropriate interval for clearance.    Off work start date: 10/15/20        Notify Employee Health if you have a marked increase  in symptomswhile you are off.[phone] or email [email or web address].    For [place] Employees, please follow this link to view a copy of the consent form:  [place] Consent Form     For [place] Employees, please follow this link to view a copy of the consent form:   [place] Consent Form     Thank you,    Employee Health Central COVID Team    [phone]    Hours: M-F [phone] Saturday - please answer your phone if an outside line is calling, regardless of hours we work 7days a week and will follow up as appropriate.     [email or web address]    Cc: Manager  
minimum assist (75% patients effort)

## 2023-12-07 NOTE — PROGRESS NOTE ADULT - SUBJECTIVE AND OBJECTIVE BOX
POD  #:  1  S/P  Right TKR                       SUBJECTIVE: Patient seen and examined. Ambulated with PT. Tolerating diet. Voiding. Resting in bed and comfortably.   Reported Pain Score = 1/10 and well controlled now on regimen. Had a tougher night after anesthesia wore off and took 10 mg of Oxycodone to help which relieved the pain. Patient is now resting comfortably in bed and reports minimal pain. Discussed multi-modal pain regimen with patient who expressed full understanding.   Denies: CP/SOB/N/V/Numbness/Tingling    OBJECTIVE:     Vital Signs Last 24 Hrs  T(C): 36.5 (06 Dec 2023 23:20), Max: 36.5 (06 Dec 2023 23:20)  T(F): 97.7 (06 Dec 2023 23:20), Max: 97.7 (06 Dec 2023 23:20)  HR: 73 (07 Dec 2023 03:00) (73 - 90)  BP: 91/58 (07 Dec 2023 03:00) (91/58 - 163/72)  RR: 17 (07 Dec 2023 03:00) (10 - 21)  SpO2: 95% (07 Dec 2023 03:00) (94% - 100%)    Parameters below as of 07 Dec 2023 03:00  Patient On (Oxygen Delivery Method): room air        Gen: AAOx3, NAD  Abd: soft, NT, ND  Right Knee:          Bulky Dressing: clean/dry/intact , no erythema or discharge noted, Bulky dressing removed, only mild area of drainage noted on some 4x4 pads. No active drainage. Wound well approximated with nylon sutures in place. New dressing applied with 4x4 pads, ABDs, and ACE wrap applied. Spandage applied over for comfort.   Bilateral LEs:         Sensation:  intact to light touch          Motor exam:  5/5 dorsiflexion/plantarflexion/EHL          2+ DP pulses          calf supple, NT         SCDs in place      MEDICATIONS:  Anticoagulation:  aspirin enteric coated 81 milliGRAM(s) Oral every 12 hours      Pain medications:   acetaminophen     Tablet .. 1000 milliGRAM(s) Oral every 8 hours  celecoxib 200 milliGRAM(s) Oral every 12 hours  HYDROmorphone  Injectable 0.5 milliGRAM(s) IV Push every 3 hours PRN  oxyCODONE    IR 5 milliGRAM(s) Oral every 3 hours PRN  oxyCODONE    IR 10 milliGRAM(s) Oral every 3 hours PRN        A/P :68y Female, Patient stable  s/p Right TKR POD # 1  -    Pain control: Acetaminophen, Celebrex, Oxycodone, Dilaudid   -    Cryotherapy and Elevation of operative extremity to help with pain and swelling  -    Medicine co-managment  -    Incentive Spirometry  -    DVT ppx: Aspirin 81mg q 12 h   -    Weight bearing status: WBAT   -    Physical Therapy- pending clearance, may need NIKOS due to home situation/lives alone.   -    Occupational Therapy  -    Discharge plan: home today pending PT, OT, and Medicine clearance, may need NIKOS pending PT eval.  POD  #:  1  S/P  Right TKR                       SUBJECTIVE: Patient seen and examined. Ambulated with PT. Tolerating diet. Voiding. Resting in bed and comfortably.   Reported Pain Score = 1/10 and well controlled now on regimen. Had a tougher night after anesthesia wore off and took 10 mg of Oxycodone to help which relieved the pain. Patient is now resting comfortably in bed and reports minimal pain. Discussed multi-modal pain regimen with patient who expressed full understanding.   Denies: CP/SOB/N/V/Numbness/Tingling    OBJECTIVE:     Vital Signs Last 24 Hrs  T(C): 36.5 (06 Dec 2023 23:20), Max: 36.5 (06 Dec 2023 23:20)  T(F): 97.7 (06 Dec 2023 23:20), Max: 97.7 (06 Dec 2023 23:20)  HR: 73 (07 Dec 2023 03:00) (73 - 90)  BP: 91/58 (07 Dec 2023 03:00) (91/58 - 163/72)  RR: 17 (07 Dec 2023 03:00) (10 - 21)  SpO2: 95% (07 Dec 2023 03:00) (94% - 100%)    Parameters below as of 07 Dec 2023 03:00  Patient On (Oxygen Delivery Method): room air        Gen: AAOx3, NAD  Abd: soft, NT, ND  Right Knee:          Bulky Dressing: clean/dry/intact , no erythema or discharge noted, Bulky dressing removed, only mild area of drainage noted on some 4x4 pads. No active drainage. Wound well approximated with nylon sutures in place. New dressing applied with 4x4 pads, ABDs, and ACE wrap applied. Spandage applied over for comfort.   Bilateral LEs:         Sensation:  intact to light touch          Motor exam:  5/5 dorsiflexion/plantarflexion/EHL          2+ DP pulses          calf supple, NT         SCDs in place      MEDICATIONS:  Anticoagulation:  aspirin enteric coated 81 milliGRAM(s) Oral every 12 hours      Pain medications:   acetaminophen     Tablet .. 1000 milliGRAM(s) Oral every 8 hours  celecoxib 200 milliGRAM(s) Oral every 12 hours  HYDROmorphone  Injectable 0.5 milliGRAM(s) IV Push every 3 hours PRN  oxyCODONE    IR 5 milliGRAM(s) Oral every 3 hours PRN  oxyCODONE    IR 10 milliGRAM(s) Oral every 3 hours PRN        A/P :68y Female, Patient stable  s/p Right TKR POD # 1  -    Pain control: Acetaminophen, Celebrex, Oxycodone, Dilaudid   -    Cryotherapy and Elevation of operative extremity to help with pain and swelling  -    Medicine co-managment  -    F/u on AM labs- pending.   -    Incentive Spirometry  -    DVT ppx: Aspirin 81mg q 12 h   -    Weight bearing status: WBAT   -    Physical Therapy- pending clearance, may need NIKOS due to home situation/lives alone.   -    Occupational Therapy  -    Discharge plan: home today pending normal Labs (WNL) and  PT, OT, and Medicine clearance, may need NIKOS pending PT eval.     Dr. Alex also rounded on patient this morning and agrees with above plan

## 2023-12-07 NOTE — OCCUPATIONAL THERAPY INITIAL EVALUATION ADULT - NSOTDISCHREC_GEN_A_CORE
pending progress Pt would benefit from continued OT in inpatient rehabilitation facility to optimize pt's function, safety and maximize return to prior level of functioning. SW made aware./Sub-acute Rehab/Home OT

## 2023-12-07 NOTE — SOCIAL WORK PROGRESS NOTE - NSSWPROGRESSNOTE_GEN_ALL_CORE
referred patient to [facility] per family request at [place]. We have MYRON and awaiting NIKOS choices from nephew  referred patient to [facility] per family request at [place]. We have MYRON and awaiting NIKOS choices from nephew

## 2023-12-07 NOTE — CONSULT NOTE ADULT - SUBJECTIVE AND OBJECTIVE BOX
POD#1 R TKR     The patient reported feeling lightheaded and nauseas   Denied CP, SOB, leg swelling, fever, chills or excessive knee pain  /80mmHg, non tachycardic, SPO2 and temp nl         REVIEW OF SYSTEMS:  CONSTITUTIONAL: No fever, weight loss, or fatigue    EYES: No eye pain, visual disturbances, or discharge    ENMT:  No difficulty hearing, tinnitus, vertigo; No sinus or throat pain    NECK: No pain or stiffness    RESPIRATORY: No cough, wheezing, chills or hemoptysis; No shortness of breath    CARDIOVASCULAR: No chest pain, palpitations, dizziness, or leg swelling    GASTROINTESTINAL: No abdominal or epigastric pain. No nausea, vomiting, or hematemesis; No diarrhea or constipation. No melena or hematochezia.    NEUROLOGICAL: No headaches, memory loss, loss of strength, numbness, or tremors    SKIN: No itching, burning, rashes, or lesions     LYMPH NODES: No enlarged glands    ENDOCRINE: No heat or cold intolerance; No hair loss    PSYCHIATRIC: No depression, anxiety, mood swings, or difficulty sleeping    HEME/LYMPH: No easy bruising, or bleeding gums    ALLERGY AND IMMUNOLOGIC: No hives or eczema       PAST MEDICAL & SURGICAL HISTORY:  HTN (hypertension)      HLD (hyperlipidemia)      Osteoarthritis of right knee          SOCIAL HISTORY:  Residence: [ ] [facility]  [ ] SNF  [ ] Community  [ ] Substance abuse:   [ ] Tobacco:   [ ] Alcohol use:     Allergies    No Known Allergies    Intolerances        MEDICATIONS  (STANDING):  acetaminophen   IVPB .. 1000 milliGRAM(s) IV Intermittent every 6 hours  atorvastatin 20 milliGRAM(s) Oral at bedtime  ceFAZolin   IVPB 2000 milliGRAM(s) IV Intermittent every 8 hours  celecoxib 200 milliGRAM(s) Oral every 12 hours  lactated ringers. 1000 milliLiter(s) (100 mL/Hr) IV Continuous <Continuous>  pantoprazole    Tablet 40 milliGRAM(s) Oral before breakfast  polyethylene glycol 3350 17 Gram(s) Oral at bedtime  senna 2 Tablet(s) Oral at bedtime    MEDICATIONS  (PRN):  HYDROmorphone  Injectable 0.5 milliGRAM(s) IV Push every 3 hours PRN breakthrough pain  magnesium hydroxide Suspension 30 milliLiter(s) Oral daily PRN Constipation  ondansetron Injectable 4 milliGRAM(s) IV Push every 6 hours PRN Nausea and/or Vomiting  oxyCODONE    IR 5 milliGRAM(s) Oral every 3 hours PRN Moderate Pain (4 - 6)  oxyCODONE    IR 10 milliGRAM(s) Oral every 3 hours PRN Severe Pain (7 - 10)      FAMILY HISTORY:      Vital Signs Last 24 Hrs  T(C): 36.3 (06 Dec 2023 17:00), Max: 36.4 (06 Dec 2023 14:55)  T(F): 97.4 (06 Dec 2023 17:00), Max: 97.5 (06 Dec 2023 14:55)  HR: 83 (06 Dec 2023 17:00) (80 - 90)  BP: 133/71 (06 Dec 2023 17:00) (107/67 - 163/72)  BP(mean): --  RR: 12 (06 Dec 2023 17:00) (10 - 21)  SpO2: 99% (06 Dec 2023 17:00) (96% - 100%)    Parameters below as of 06 Dec 2023 16:15  Patient On (Oxygen Delivery Method): room air        PHYSICAL EXAM:  GENERAL: Appears pale  HEAD:  Atraumatic, Normocephalic    EYES: EOMI, PERRLA, conjunctiva and sclera clear    NERVOUS SYSTEM:  Alert & Oriented X3, Good concentration; Moving all 4 extremities; No gross sensory deficits    CHEST/LUNG: Clear to auscultation bilaterally; No rales, rhonchi, wheezing, or rubs    HEART: Regular rate and rhythm; No murmurs, rubs, or gallops    ABDOMEN: Soft, Nontender, Nondistended; Bowel sounds present    EXTREMITIES:  2+ Peripheral Pulses, No clubbing, cyanosis, or edema    INCISION R knee dressing clean and dry, neurovascularly intact     LABS:              CAPILLARY BLOOD GLUCOSE          RADIOLOGY & ADDITIONAL STUDIES:    EKG:   Personally Reviewed:  [ ] YES     Imaging:   Personally Reviewed:  [ ] YES     Consultant(s) Notes Reviewed:      Care Discussed with Consultants/Other Providers:                POD#1 R TKR     The patient reported feeling lightheaded and nauseas   Denied CP, SOB, leg swelling, fever, chills or excessive knee pain  /80mmHg, non tachycardic, SPO2 and temp nl         REVIEW OF SYSTEMS:  CONSTITUTIONAL: No fever, weight loss, or fatigue    EYES: No eye pain, visual disturbances, or discharge    ENMT:  No difficulty hearing, tinnitus, vertigo; No sinus or throat pain    NECK: No pain or stiffness    RESPIRATORY: No cough, wheezing, chills or hemoptysis; No shortness of breath    CARDIOVASCULAR: No chest pain, palpitations, dizziness, or leg swelling    GASTROINTESTINAL: No abdominal or epigastric pain. No nausea, vomiting, or hematemesis; No diarrhea or constipation. No melena or hematochezia.    NEUROLOGICAL: No headaches, memory loss, loss of strength, numbness, or tremors    SKIN: No itching, burning, rashes, or lesions     LYMPH NODES: No enlarged glands    ENDOCRINE: No heat or cold intolerance; No hair loss    PSYCHIATRIC: No depression, anxiety, mood swings, or difficulty sleeping    HEME/LYMPH: No easy bruising, or bleeding gums    ALLERGY AND IMMUNOLOGIC: No hives or eczema       PAST MEDICAL & SURGICAL HISTORY:  HTN (hypertension)      HLD (hyperlipidemia)      Osteoarthritis of right knee          SOCIAL HISTORY:  Residence: [ ] [facility]  [ ] SNF  [ ] Community  [ ] Substance abuse:   [ ] Tobacco:   [ ] Alcohol use:     Allergies    No Known Allergies    Intolerances        MEDICATIONS  (STANDING):  acetaminophen   IVPB .. 1000 milliGRAM(s) IV Intermittent every 6 hours  atorvastatin 20 milliGRAM(s) Oral at bedtime  ceFAZolin   IVPB 2000 milliGRAM(s) IV Intermittent every 8 hours  celecoxib 200 milliGRAM(s) Oral every 12 hours  lactated ringers. 1000 milliLiter(s) (100 mL/Hr) IV Continuous <Continuous>  pantoprazole    Tablet 40 milliGRAM(s) Oral before breakfast  polyethylene glycol 3350 17 Gram(s) Oral at bedtime  senna 2 Tablet(s) Oral at bedtime    MEDICATIONS  (PRN):  HYDROmorphone  Injectable 0.5 milliGRAM(s) IV Push every 3 hours PRN breakthrough pain  magnesium hydroxide Suspension 30 milliLiter(s) Oral daily PRN Constipation  ondansetron Injectable 4 milliGRAM(s) IV Push every 6 hours PRN Nausea and/or Vomiting  oxyCODONE    IR 5 milliGRAM(s) Oral every 3 hours PRN Moderate Pain (4 - 6)  oxyCODONE    IR 10 milliGRAM(s) Oral every 3 hours PRN Severe Pain (7 - 10)      FAMILY HISTORY:      Vital Signs Last 24 Hrs  T(C): 36.3 (06 Dec 2023 17:00), Max: 36.4 (06 Dec 2023 14:55)  T(F): 97.4 (06 Dec 2023 17:00), Max: 97.5 (06 Dec 2023 14:55)  HR: 83 (06 Dec 2023 17:00) (80 - 90)  BP: 133/71 (06 Dec 2023 17:00) (107/67 - 163/72)  BP(mean): --  RR: 12 (06 Dec 2023 17:00) (10 - 21)  SpO2: 99% (06 Dec 2023 17:00) (96% - 100%)    Parameters below as of 06 Dec 2023 16:15  Patient On (Oxygen Delivery Method): room air        PHYSICAL EXAM:  GENERAL: Appears pale  HEAD:  Atraumatic, Normocephalic    EYES: EOMI, PERRLA, conjunctiva and sclera clear    NERVOUS SYSTEM:  Alert & Oriented X3, Good concentration; Moving all 4 extremities; No gross sensory deficits    CHEST/LUNG: Clear to auscultation bilaterally; No rales, rhonchi, wheezing, or rubs    HEART: Regular rate and rhythm; No murmurs, rubs, or gallops    ABDOMEN: Soft, Nontender, Nondistended; Bowel sounds present    EXTREMITIES:  2+ Peripheral Pulses, No clubbing, cyanosis, or edema    INCISION R knee dressing clean and dry, neurovascularly intact     LABS:              CAPILLARY BLOOD GLUCOSE          RADIOLOGY & ADDITIONAL STUDIES:    EKG:   Personally Reviewed:  [ ] YES     Imaging:   Personally Reviewed:  [ ] YES     Consultant(s) Notes Reviewed:      Care Discussed with Consultants/Other Providers:

## 2023-12-07 NOTE — PATIENT PROFILE ADULT - FALL HARM RISK - HARM RISK INTERVENTIONS
Assistance with ambulation/Assistance OOB with selected safe patient handling equipment/Communicate Risk of Fall with Harm to all staff/Discuss with provider need for PT consult/Monitor gait and stability/Provide patient with walking aids - walker, cane, crutches/Reinforce activity limits and safety measures with patient and family/Sit up slowly, dangle for a short time, stand at bedside before walking/Tailored Fall Risk Interventions/Use of alarms - bed, chair and/or voice tab/Visual Cue: Yellow wristband and red socks/Bed in lowest position, wheels locked, appropriate side rails in place/Call bell, personal items and telephone in reach/Instruct patient to call for assistance before getting out of bed or chair/Non-slip footwear when patient is out of bed/[place] to call system/Physically safe environment - no spills, clutter or unnecessary equipment/Purposeful Proactive Rounding/Room/bathroom lighting operational, light cord in reach Assistance with ambulation/Assistance OOB with selected safe patient handling equipment/Communicate Risk of Fall with Harm to all staff/Discuss with provider need for PT consult/Monitor gait and stability/Provide patient with walking aids - walker, cane, crutches/Reinforce activity limits and safety measures with patient and family/Sit up slowly, dangle for a short time, stand at bedside before walking/Tailored Fall Risk Interventions/Use of alarms - bed, chair and/or voice tab/Visual Cue: Yellow wristband and red socks/Bed in lowest position, wheels locked, appropriate side rails in place/Call bell, personal items and telephone in reach/Instruct patient to call for assistance before getting out of bed or chair/Non-slip footwear when patient is out of bed/[place] to call system/Physically safe environment - no spills, clutter or unnecessary equipment/Purposeful Proactive Rounding/Room/bathroom lighting operational, light cord in reach

## 2023-12-07 NOTE — CARE COORDINATION ASSESSMENT. - OTHER PERTINENT DISCHARGE PLANNING INFORMATION:
met with this 68 year old female admitted from home 12/6/2023 s/p right TKR by Dr Alex and was admitted as 23 hour stay. She is recommended for rehab, I asked ortho PA to enter in patient order. I requested [facility] and met with patient who is receptive to rehab. I provided her with transition of care packet and she will review but asked me to call her nephew who she also says is her HCP Sissy [phone]. He asked me to email list for [place] and [place] and provided same to patient. He requested [facility] at [place] I explained criteria and suggested he review list and provided 4 choices so we can start [facility] as well. Patient resides with nephew and niece who cannot assist her at home own medical issues. She has walker and commode at home. She was dizzy and buckeling per PTE and OT. Her cell [phone]. Plan for [facility] pending medically optimized, bed and insurance approval   met with this 68 year old female admitted from home 12/6/2023 s/p right TKR by Dr Alex and was admitted as 23 hour stay. She is recommended for rehab, I asked ortho PA to enter in patient order. I requested [facility] and met with patient who is receptive to rehab. I provided her with transition of care packet and she will review but asked me to call her nephew who she also says is her HCP Sissy [phone]. He asked me to email list for [place] [facility] [place] and provided same to patient. He requested [facility] at [place] I explained criteria and suggested he review list and provided 4 choices so we can start [facility] as well. Patient resides with nephew and niece who cannot assist her at home own medical issues. She has walker and commode at home. She was dizzy and buckeling per PTE and OT. Her cell [phone]. Plan for [facility] pending medically optimized, bed and insurance approval

## 2023-12-07 NOTE — PATIENT CHOICE NOTE. - NSPTCHOICESTATE_GEN_ALL_CORE
I have met with the patient and/or caregiver to discuss discharge goals and treatment plan. Patient and/or caregiver also provided with instructions on accessing the CMS Compare websites for additional information related to Post Acute Provider quality and resource use measures to assist them in evaluation of the providers and in selecting their post-acute provider of choice. Patient and caregiver were informed of the facilities that are owned and/or operated by [facility]. I have discussed with the patient the availability of in-network facilities and providers. Patient and caregiver provided with a list of post-acute providers whose services are appropriate to the discharge plans and patient needs.     For patient requiring durable medical equipment, patient and/or caregiver were informed that they have the right to request who provides the required equipment. I have met with the patient and/or caregiver to discuss discharge goals and treatment plan. Patient and/or caregiver also provided with instructions on accessing the CMS Compare websites for additional information related to Post Acute Provider quality and resource use measures to assist them in evaluation of the providers and in selecting their post-acute provider of choice. Patient and caregiver were informed of the facilities that are owned and/or operated by [facility]. I have discussed with the patient the availability of in-network facilities and providers. Patient and caregiver provided with a list of post-acute providers whose services are appropriate to the discharge plans and patient needs.     For patient requiring durable medical equipment, patient and/or caregiver were informed that they have the right to request who provides the required equipment.

## 2023-12-08 ENCOUNTER — TRANSCRIPTION ENCOUNTER (OUTPATIENT)
Age: 68
End: 2023-12-08

## 2023-12-08 LAB
HCT VFR BLD CALC: 26.6 % — LOW (ref 34.5–45)
HCT VFR BLD CALC: 26.6 % — LOW (ref 34.5–45)
HGB BLD-MCNC: 8.5 G/DL — LOW (ref 11.5–15.5)
HGB BLD-MCNC: 8.5 G/DL — LOW (ref 11.5–15.5)
MCHC RBC-ENTMCNC: 28.7 PG — SIGNIFICANT CHANGE UP (ref 27–34)
MCHC RBC-ENTMCNC: 28.7 PG — SIGNIFICANT CHANGE UP (ref 27–34)
MCHC RBC-ENTMCNC: 32 GM/DL — SIGNIFICANT CHANGE UP (ref 32–36)
MCHC RBC-ENTMCNC: 32 GM/DL — SIGNIFICANT CHANGE UP (ref 32–36)
MCV RBC AUTO: 89.9 FL — SIGNIFICANT CHANGE UP (ref 80–100)
MCV RBC AUTO: 89.9 FL — SIGNIFICANT CHANGE UP (ref 80–100)
NRBC # BLD: 0 /100 WBCS — SIGNIFICANT CHANGE UP (ref 0–0)
NRBC # BLD: 0 /100 WBCS — SIGNIFICANT CHANGE UP (ref 0–0)
PLATELET # BLD AUTO: 209 K/UL — SIGNIFICANT CHANGE UP (ref 150–400)
PLATELET # BLD AUTO: 209 K/UL — SIGNIFICANT CHANGE UP (ref 150–400)
RBC # BLD: 2.96 M/UL — LOW (ref 3.8–5.2)
RBC # BLD: 2.96 M/UL — LOW (ref 3.8–5.2)
RBC # FLD: 13.3 % — SIGNIFICANT CHANGE UP (ref 10.3–14.5)
RBC # FLD: 13.3 % — SIGNIFICANT CHANGE UP (ref 10.3–14.5)
WBC # BLD: 9.77 K/UL — SIGNIFICANT CHANGE UP (ref 3.8–10.5)
WBC # BLD: 9.77 K/UL — SIGNIFICANT CHANGE UP (ref 3.8–10.5)
WBC # FLD AUTO: 9.77 K/UL — SIGNIFICANT CHANGE UP (ref 3.8–10.5)
WBC # FLD AUTO: 9.77 K/UL — SIGNIFICANT CHANGE UP (ref 3.8–10.5)

## 2023-12-08 PROCEDURE — 74018 RADEX ABDOMEN 1 VIEW: CPT | Mod: 26

## 2023-12-08 PROCEDURE — 99232 SBSQ HOSP IP/OBS MODERATE 35: CPT

## 2023-12-08 RX ORDER — CELECOXIB 200 MG/1
1 CAPSULE ORAL
Qty: 0 | Refills: 0 | DISCHARGE
Start: 2023-12-08

## 2023-12-08 RX ORDER — OXYCODONE HYDROCHLORIDE 5 MG/1
1 TABLET ORAL
Qty: 0 | Refills: 0 | DISCHARGE
Start: 2023-12-08

## 2023-12-08 RX ORDER — TRAMADOL HYDROCHLORIDE 50 MG/1
50 TABLET ORAL EVERY 6 HOURS
Refills: 0 | Status: DISCONTINUED | OUTPATIENT
Start: 2023-12-08 | End: 2023-12-09

## 2023-12-08 RX ORDER — PANTOPRAZOLE SODIUM 20 MG/1
1 TABLET, DELAYED RELEASE ORAL
Qty: 0 | Refills: 0 | DISCHARGE
Start: 2023-12-08

## 2023-12-08 RX ORDER — ASPIRIN/CALCIUM CARB/MAGNESIUM 324 MG
1 TABLET ORAL
Qty: 0 | Refills: 0 | DISCHARGE
Start: 2023-12-08

## 2023-12-08 RX ADMIN — ATORVASTATIN CALCIUM 20 MILLIGRAM(S): 80 TABLET, FILM COATED ORAL at 21:45

## 2023-12-08 RX ADMIN — Medication 1000 MILLIGRAM(S): at 14:56

## 2023-12-08 RX ADMIN — Medication 1000 MILLIGRAM(S): at 05:23

## 2023-12-08 RX ADMIN — CELECOXIB 200 MILLIGRAM(S): 200 CAPSULE ORAL at 21:51

## 2023-12-08 RX ADMIN — OXYCODONE HYDROCHLORIDE 10 MILLIGRAM(S): 5 TABLET ORAL at 08:50

## 2023-12-08 RX ADMIN — CELECOXIB 200 MILLIGRAM(S): 200 CAPSULE ORAL at 08:53

## 2023-12-08 RX ADMIN — ONDANSETRON 4 MILLIGRAM(S): 8 TABLET, FILM COATED ORAL at 14:57

## 2023-12-08 RX ADMIN — OXYCODONE HYDROCHLORIDE 5 MILLIGRAM(S): 5 TABLET ORAL at 14:56

## 2023-12-08 RX ADMIN — Medication 81 MILLIGRAM(S): at 05:23

## 2023-12-08 RX ADMIN — PANTOPRAZOLE SODIUM 40 MILLIGRAM(S): 20 TABLET, DELAYED RELEASE ORAL at 05:23

## 2023-12-08 RX ADMIN — CELECOXIB 200 MILLIGRAM(S): 200 CAPSULE ORAL at 09:13

## 2023-12-08 RX ADMIN — Medication 1000 MILLIGRAM(S): at 05:27

## 2023-12-08 RX ADMIN — OXYCODONE HYDROCHLORIDE 10 MILLIGRAM(S): 5 TABLET ORAL at 09:30

## 2023-12-08 RX ADMIN — ONDANSETRON 4 MILLIGRAM(S): 8 TABLET, FILM COATED ORAL at 08:46

## 2023-12-08 RX ADMIN — Medication 1000 MILLIGRAM(S): at 21:45

## 2023-12-08 RX ADMIN — Medication 1000 MILLIGRAM(S): at 21:51

## 2023-12-08 RX ADMIN — Medication 81 MILLIGRAM(S): at 18:20

## 2023-12-08 RX ADMIN — OXYCODONE HYDROCHLORIDE 5 MILLIGRAM(S): 5 TABLET ORAL at 15:30

## 2023-12-08 RX ADMIN — CELECOXIB 200 MILLIGRAM(S): 200 CAPSULE ORAL at 21:45

## 2023-12-08 NOTE — SOCIAL WORK PROGRESS NOTE - NSSWPROGRESSNOTE_GEN_ALL_CORE
Pt will be discharged today at [facility] at 3pm via [facility].  Pt is in agreement with plan and will call in with credit card for transport.   Pt will be discharged today at [facility] at 3pm via [facility].  Pt is in agreement with plan and will call in with credit card for transport.

## 2023-12-08 NOTE — CHART NOTE - NSCHARTNOTEFT_GEN_A_CORE
Notified by RN that pt was getting ready for dc when she started feeling nauseas, lightheaded and had 2 episodes of nbnb emesis   VSS   Exam overall benign  Symptoms possibly due to opiate related constipation, post op ileus, and/or hypovolemia     Plan:  Obtain orthostatic vitals, cbc, bmp,  Reduce opiate dose, laxatives, zofran, iv fluids, AXR   Sx not suspicios for cns etiology at this time  Hold dismissal   Discussed with RN

## 2023-12-08 NOTE — CONSULT NOTE ADULT - SUBJECTIVE AND OBJECTIVE BOX
POD#2 R TKR     Pt reports feeling better  No further nausea or dizziness  Looking forward to partake in PT     REVIEW OF SYSTEMS:  Negative other than aforementioned     PAST MEDICAL & SURGICAL HISTORY:  HTN (hypertension)      HLD (hyperlipidemia)      Osteoarthritis of right knee          SOCIAL HISTORY:  Residence: [ ] [facility]  [ ] SNF  [ ] Community  [ ] Substance abuse:   [ ] Tobacco:   [ ] Alcohol use:     Allergies    No Known Allergies    Intolerances        MEDICATIONS  (STANDING):  acetaminophen   IVPB .. 1000 milliGRAM(s) IV Intermittent every 6 hours  atorvastatin 20 milliGRAM(s) Oral at bedtime  ceFAZolin   IVPB 2000 milliGRAM(s) IV Intermittent every 8 hours  celecoxib 200 milliGRAM(s) Oral every 12 hours  lactated ringers. 1000 milliLiter(s) (100 mL/Hr) IV Continuous <Continuous>  pantoprazole    Tablet 40 milliGRAM(s) Oral before breakfast  polyethylene glycol 3350 17 Gram(s) Oral at bedtime  senna 2 Tablet(s) Oral at bedtime    MEDICATIONS  (PRN):  HYDROmorphone  Injectable 0.5 milliGRAM(s) IV Push every 3 hours PRN breakthrough pain  magnesium hydroxide Suspension 30 milliLiter(s) Oral daily PRN Constipation  ondansetron Injectable 4 milliGRAM(s) IV Push every 6 hours PRN Nausea and/or Vomiting  oxyCODONE    IR 5 milliGRAM(s) Oral every 3 hours PRN Moderate Pain (4 - 6)  oxyCODONE    IR 10 milliGRAM(s) Oral every 3 hours PRN Severe Pain (7 - 10)      FAMILY HISTORY:      Vital Signs Last 24 Hrs  T(C): 36.3 (06 Dec 2023 17:00), Max: 36.4 (06 Dec 2023 14:55)  T(F): 97.4 (06 Dec 2023 17:00), Max: 97.5 (06 Dec 2023 14:55)  HR: 83 (06 Dec 2023 17:00) (80 - 90)  BP: 133/71 (06 Dec 2023 17:00) (107/67 - 163/72)  BP(mean): --  RR: 12 (06 Dec 2023 17:00) (10 - 21)  SpO2: 99% (06 Dec 2023 17:00) (96% - 100%)    Parameters below as of 06 Dec 2023 16:15  Patient On (Oxygen Delivery Method): room air        PHYSICAL EXAM:  GENERAL: nad  HEAD:  Atraumatic, Normocephalic    EYES: EOMI, PERRLA, conjunctiva and sclera clear    NERVOUS SYSTEM:  Alert & Oriented X3, Good concentration; Moving all 4 extremities; No gross sensory deficits    CHEST/LUNG: Clear to auscultation bilaterally; No rales, rhonchi, wheezing, or rubs    HEART: Regular rate and rhythm; No murmurs, rubs, or gallops    ABDOMEN: Soft, Nontender, Nondistended; Bowel sounds present    EXTREMITIES:  2+ Peripheral Pulses, No clubbing, cyanosis, or edema    INCISION R knee dressing clean and dry, neurovascularly intact     LABS:              CAPILLARY BLOOD GLUCOSE          RADIOLOGY & ADDITIONAL STUDIES:    EKG:   Personally Reviewed:  [ ] YES     Imaging:   Personally Reviewed:  [ ] YES     Consultant(s) Notes Reviewed:      Care Discussed with Consultants/Other Providers:                (2) good, crying POD#2 R TKR     Pt reports feeling better  No further nausea or dizziness  Looking forward to partake in PT     REVIEW OF SYSTEMS:  Negative other than aforementioned     PAST MEDICAL & SURGICAL HISTORY:  HTN (hypertension)      HLD (hyperlipidemia)      Osteoarthritis of right knee          SOCIAL HISTORY:  Residence: [ ] [facility]  [ ] SNF  [ ] Community  [ ] Substance abuse:   [ ] Tobacco:   [ ] Alcohol use:     Allergies    No Known Allergies    Intolerances        MEDICATIONS  (STANDING):  acetaminophen   IVPB .. 1000 milliGRAM(s) IV Intermittent every 6 hours  atorvastatin 20 milliGRAM(s) Oral at bedtime  ceFAZolin   IVPB 2000 milliGRAM(s) IV Intermittent every 8 hours  celecoxib 200 milliGRAM(s) Oral every 12 hours  lactated ringers. 1000 milliLiter(s) (100 mL/Hr) IV Continuous <Continuous>  pantoprazole    Tablet 40 milliGRAM(s) Oral before breakfast  polyethylene glycol 3350 17 Gram(s) Oral at bedtime  senna 2 Tablet(s) Oral at bedtime    MEDICATIONS  (PRN):  HYDROmorphone  Injectable 0.5 milliGRAM(s) IV Push every 3 hours PRN breakthrough pain  magnesium hydroxide Suspension 30 milliLiter(s) Oral daily PRN Constipation  ondansetron Injectable 4 milliGRAM(s) IV Push every 6 hours PRN Nausea and/or Vomiting  oxyCODONE    IR 5 milliGRAM(s) Oral every 3 hours PRN Moderate Pain (4 - 6)  oxyCODONE    IR 10 milliGRAM(s) Oral every 3 hours PRN Severe Pain (7 - 10)      FAMILY HISTORY:      Vital Signs Last 24 Hrs  T(C): 36.3 (06 Dec 2023 17:00), Max: 36.4 (06 Dec 2023 14:55)  T(F): 97.4 (06 Dec 2023 17:00), Max: 97.5 (06 Dec 2023 14:55)  HR: 83 (06 Dec 2023 17:00) (80 - 90)  BP: 133/71 (06 Dec 2023 17:00) (107/67 - 163/72)  BP(mean): --  RR: 12 (06 Dec 2023 17:00) (10 - 21)  SpO2: 99% (06 Dec 2023 17:00) (96% - 100%)    Parameters below as of 06 Dec 2023 16:15  Patient On (Oxygen Delivery Method): room air        PHYSICAL EXAM:  GENERAL: nad  HEAD:  Atraumatic, Normocephalic    EYES: EOMI, PERRLA, conjunctiva and sclera clear    NERVOUS SYSTEM:  Alert & Oriented X3, Good concentration; Moving all 4 extremities; No gross sensory deficits    CHEST/LUNG: Clear to auscultation bilaterally; No rales, rhonchi, wheezing, or rubs    HEART: Regular rate and rhythm; No murmurs, rubs, or gallops    ABDOMEN: Soft, Nontender, Nondistended; Bowel sounds present    EXTREMITIES:  2+ Peripheral Pulses, No clubbing, cyanosis, or edema    INCISION R knee dressing clean and dry, neurovascularly intact     LABS:              CAPILLARY BLOOD GLUCOSE          RADIOLOGY & ADDITIONAL STUDIES:    EKG:   Personally Reviewed:  [ ] YES     Imaging:   Personally Reviewed:  [ ] YES     Consultant(s) Notes Reviewed:      Care Discussed with Consultants/Other Providers:

## 2023-12-08 NOTE — DISCHARGE NOTE NURSING/CASE MANAGEMENT/SOCIAL WORK - NSDCPEFALRISK_GEN_ALL_CORE
For information on Fall & Injury Prevention, visit: [email or web address] OR  [email or web address] OR  https://www.cdc.gov/steadi/patient.html For information on Fall & Injury Prevention, visit: [email or web address] OR  [email or web address] OR  https://www.cdc.gov/steadi/patient.html

## 2023-12-08 NOTE — DISCHARGE NOTE NURSING/CASE MANAGEMENT/SOCIAL WORK - SOCIAL WORKER'S NAME
Amando Reynoso, [facility] Amando Reynoso, [facility] Sharyn Cortez, [facility] Sharyn Cortez, [facility]

## 2023-12-08 NOTE — PHARMACOTHERAPY INTERVENTION NOTE - COMMENTS
Transition of Care video discharge education - medication calendar given to patient
Admission medication reconciliation POD1

## 2023-12-08 NOTE — DISCHARGE NOTE NURSING/CASE MANAGEMENT/SOCIAL WORK - NSDPFAC_GEN_ALL_CORE
[facility] for nursing and rehab

## 2023-12-08 NOTE — CONSULT NOTE ADULT - ASSESSMENT
68F with HTN, HLD, and R knee OA POD#1 R TKR with post op nausea and dizziness resolved with IV fluid resuscitation     1. POD#2 R TKR   PT/OT evals in progress, pt states she wants to go to [facility]   Continue multimodal analgesia, bowel regimen, VTE ppx, IS   Stable for dc from medical standpoint.    2. Leucocytosis, 19K 12/7  Likely reactive post op, low suspicion for infection at this time    3. HTN   Hold norvasc     4. HLD   Resume atorvastatin 20mg HS on dc

## 2023-12-08 NOTE — DISCHARGE NOTE NURSING/CASE MANAGEMENT/SOCIAL WORK - PATIENT PORTAL LINK FT
You can access the FollowMyHealth Patient Portal offered by [facility] by registering at the following website: [email or web address] By joining [facility]’s FollowMyHealth portal, you will also be able to view your health information using other applications (apps) compatible with our system. You can access the FollowMyHealth Patient Portal offered by [facility] by registering at the following website: [email or web address]. By joining [facility]’s FollowMyHealth portal, you will also be able to view your health information using other applications (apps) compatible with our system.

## 2023-12-08 NOTE — PROGRESS NOTE ADULT - SUBJECTIVE AND OBJECTIVE BOX
Post Op Day # 1    SUBJECTIVE    69yo Female status post right TKR .   Patient just walked with PT this morning.  Reports some increase in pain as well as nausea and lightheadedness.    OBJECTIVE    Vital Signs Last 24 Hrs  T(C): 36.6 (08 Dec 2023 07:00), Max: 36.8 (07 Dec 2023 15:30)  T(F): 97.8 (08 Dec 2023 07:00), Max: 98.2 (07 Dec 2023 15:30)  HR: 89 (08 Dec 2023 07:00) (71 - 89)  BP: 152/73 (08 Dec 2023 07:00) (103/57 - 152/73)  BP(mean): 76 (07 Dec 2023 15:30) (76 - 76)  RR: 17 (08 Dec 2023 07:00) (11 - 18)  SpO2: 98% (08 Dec 2023 07:00) (94% - 98%)    Parameters below as of 08 Dec 2023 07:00  Patient On (Oxygen Delivery Method): room air      I&O's Summary    07 Dec 2023 07:01  -  08 Dec 2023 07:00  --------------------------------------------------------  IN: 0 mL / OUT: 500 mL / NET: -500 mL      PHYSICAL EXAM    Right knee nylon sutures are clean, dry and intact.   No exudate/ No blistering/ No ecchymosis.   The calf is supple/nontender.   Sensation to light touch is grossly intact distally.   Motor function distally is intact.   No foot drop.   (2+) dorsalis pedis pulse. Capillary refill is less than 2 seconds. No cyanosis.    Labs:                        10.1<L>  19.18<H> )-----------( 255      ( 07 Dec 2023 06:00 )             30.9<L>  07 Dec 2023 06:00    07 Dec 2023 06:00    139    |  105    |  15     ----------------------------<  143<H>  4.3     |  28     |  0.60     Ca    8.9        07 Dec 2023 06:00        ASSESSMENT AND PLAN  - Orthopedically stable  - Internal medicine note appreciated; will re-order CBC  - Zofran PRN for nausea  - DVT prophylaxis: PAS +ASA 81 mg Q12h  - Continue physical therapy and occupational therapy  - Weight bearing as tolerated of the right lower extremity with assistance of a walker  - Incentive spirometry encouraged  - Pain control as clinically indicated  - Disposition:  Subacute rehabilitation today        Post Op Day # 1    SUBJECTIVE    69yo Female status post right TKR .   Patient just walked with PT this morning.  Reports some increase in pain as well as nausea and lightheadedness.    OBJECTIVE    Vital Signs Last 24 Hrs  T(C): 36.6 (08 Dec 2023 07:00), Max: 36.8 (07 Dec 2023 15:30)  T(F): 97.8 (08 Dec 2023 07:00), Max: 98.2 (07 Dec 2023 15:30)  HR: 89 (08 Dec 2023 07:00) (71 - 89)  BP: 152/73 (08 Dec 2023 07:00) (103/57 - 152/73)  BP(mean): 76 (07 Dec 2023 15:30) (76 - 76)  RR: 17 (08 Dec 2023 07:00) (11 - 18)  SpO2: 98% (08 Dec 2023 07:00) (94% - 98%)    Parameters below as of 08 Dec 2023 07:00  Patient On (Oxygen Delivery Method): room air      I&O's Summary    07 Dec 2023 07:01  -  08 Dec 2023 07:00  --------------------------------------------------------  IN: 0 mL / OUT: 500 mL / NET: -500 mL      PHYSICAL EXAM    Right knee nylon sutures are clean, dry and intact.   No exudate/ No blistering/ No ecchymosis.   The calf is supple/nontender.   Sensation to light touch is grossly intact distally.   Motor function distally is intact.   No foot drop.   (2+) dorsalis pedis pulse. Capillary refill is less than 2 seconds. No cyanosis.    Labs:                        10.1<L>  19.18<H> )-----------( 255      ( 07 Dec 2023 06:00 )             30.9<L>  07 Dec 2023 06:00    07 Dec 2023 06:00    139    |  105    |  15     ----------------------------<  143<H>  4.3     |  28     |  0.60     Ca    8.9        07 Dec 2023 06:00        ASSESSMENT AND PLAN  - Orthopedically stable  - Internal medicine note appreciated  - Patient was counseled on appropriate narcotic use  - Zofran PRN for nausea  - DVT prophylaxis: PAS +ASA 81 mg Q12h  - Continue physical therapy and occupational therapy  - Weight bearing as tolerated of the right lower extremity with assistance of a walker  - Incentive spirometry encouraged  - Pain control as clinically indicated  - Disposition:  Subacute rehabilitation today        Post Op Day # 1    SUBJECTIVE    67yo Female status post right TKR .   Patient just walked with PT this morning.  Reports some increase in pain as well as nausea and lightheadedness.    OBJECTIVE    Vital Signs Last 24 Hrs  T(C): 36.6 (08 Dec 2023 07:00), Max: 36.8 (07 Dec 2023 15:30)  T(F): 97.8 (08 Dec 2023 07:00), Max: 98.2 (07 Dec 2023 15:30)  HR: 89 (08 Dec 2023 07:00) (71 - 89)  BP: 152/73 (08 Dec 2023 07:00) (103/57 - 152/73)  BP(mean): 76 (07 Dec 2023 15:30) (76 - 76)  RR: 17 (08 Dec 2023 07:00) (11 - 18)  SpO2: 98% (08 Dec 2023 07:00) (94% - 98%)    Parameters below as of 08 Dec 2023 07:00  Patient On (Oxygen Delivery Method): room air      I&O's Summary    07 Dec 2023 07:01  -  08 Dec 2023 07:00  --------------------------------------------------------  IN: 0 mL / OUT: 500 mL / NET: -500 mL      PHYSICAL EXAM    Right knee nylon sutures are clean, dry and intact.   No exudate/ No blistering/ No ecchymosis.   The calf is supple/nontender.   Sensation to light touch is grossly intact distally.   Motor function distally is intact.   No foot drop.   (2+) dorsalis pedis pulse. Capillary refill is less than 2 seconds. No cyanosis.    Labs:                        10.1<L>  19.18<H> )-----------( 255      ( 07 Dec 2023 06:00 )             30.9<L>  07 Dec 2023 06:00    07 Dec 2023 06:00    139    |  105    |  15     ----------------------------<  143<H>  4.3     |  28     |  0.60     Ca    8.9        07 Dec 2023 06:00        ASSESSMENT AND PLAN  - Orthopedically stable  - Internal medicine note appreciated  - Patient was counseled on appropriate narcotic use  - Zofran PRN for nausea  - DVT prophylaxis: PAS +ASA 81 mg Q12h  - Continue physical therapy and occupational therapy  - Weight bearing as tolerated of the right lower extremity with assistance of a walker  - Incentive spirometry encouraged  - Pain control as clinically indicated  - Disposition:  Subacute rehabilitation today

## 2023-12-08 NOTE — SOCIAL WORK PROGRESS NOTE - NSSWPROGRESSNOTE_GEN_ALL_CORE
Pts discharge was cancelled to [facility] for today because she is not feeling well and vomited.  SW sent messages to EMS and rehab to make them aware.  SW will follow up in AM for discharge readiness.   Pts discharge was cancelled to [facility] for today because she is not feeling well and vomited.  SW sent messages to EMS and rehab to make them aware.  SW will follow up in AM for discharge readiness.

## 2023-12-09 VITALS
SYSTOLIC BLOOD PRESSURE: 117 MMHG | TEMPERATURE: 98 F | HEART RATE: 82 BPM | OXYGEN SATURATION: 97 % | DIASTOLIC BLOOD PRESSURE: 71 MMHG | RESPIRATION RATE: 18 BRPM

## 2023-12-09 PROCEDURE — 73560 X-RAY EXAM OF KNEE 1 OR 2: CPT

## 2023-12-09 PROCEDURE — 97530 THERAPEUTIC ACTIVITIES: CPT

## 2023-12-09 PROCEDURE — C1776: CPT

## 2023-12-09 PROCEDURE — 88305 TISSUE EXAM BY PATHOLOGIST: CPT

## 2023-12-09 PROCEDURE — 97116 GAIT TRAINING THERAPY: CPT

## 2023-12-09 PROCEDURE — 99232 SBSQ HOSP IP/OBS MODERATE 35: CPT

## 2023-12-09 PROCEDURE — S2900: CPT

## 2023-12-09 PROCEDURE — 97161 PT EVAL LOW COMPLEX 20 MIN: CPT

## 2023-12-09 PROCEDURE — 36415 COLL VENOUS BLD VENIPUNCTURE: CPT

## 2023-12-09 PROCEDURE — 85027 COMPLETE CBC AUTOMATED: CPT

## 2023-12-09 PROCEDURE — 97110 THERAPEUTIC EXERCISES: CPT

## 2023-12-09 PROCEDURE — C1713: CPT

## 2023-12-09 PROCEDURE — C1889: CPT

## 2023-12-09 PROCEDURE — 97535 SELF CARE MNGMENT TRAINING: CPT

## 2023-12-09 PROCEDURE — 97165 OT EVAL LOW COMPLEX 30 MIN: CPT

## 2023-12-09 PROCEDURE — 88311 DECALCIFY TISSUE: CPT

## 2023-12-09 PROCEDURE — 80048 BASIC METABOLIC PNL TOTAL CA: CPT

## 2023-12-09 PROCEDURE — 74018 RADEX ABDOMEN 1 VIEW: CPT

## 2023-12-09 RX ADMIN — Medication 1000 MILLIGRAM(S): at 06:49

## 2023-12-09 RX ADMIN — TRAMADOL HYDROCHLORIDE 50 MILLIGRAM(S): 50 TABLET ORAL at 09:30

## 2023-12-09 RX ADMIN — Medication 1000 MILLIGRAM(S): at 12:08

## 2023-12-09 RX ADMIN — Medication 1000 MILLIGRAM(S): at 06:50

## 2023-12-09 RX ADMIN — CELECOXIB 200 MILLIGRAM(S): 200 CAPSULE ORAL at 08:49

## 2023-12-09 RX ADMIN — Medication 1000 MILLIGRAM(S): at 13:10

## 2023-12-09 RX ADMIN — Medication 81 MILLIGRAM(S): at 06:48

## 2023-12-09 RX ADMIN — PANTOPRAZOLE SODIUM 40 MILLIGRAM(S): 20 TABLET, DELAYED RELEASE ORAL at 06:49

## 2023-12-09 RX ADMIN — CELECOXIB 200 MILLIGRAM(S): 200 CAPSULE ORAL at 08:42

## 2023-12-09 RX ADMIN — TRAMADOL HYDROCHLORIDE 50 MILLIGRAM(S): 50 TABLET ORAL at 08:44

## 2023-12-09 NOTE — PROGRESS NOTE ADULT - SUBJECTIVE AND OBJECTIVE BOX
Patient is a 68y old  Female who presents with a chief complaint of right knee djd severe   Right total knee replacement (06 Dec 2023 15:25)        HPI:68F with HTN, HLD, and R knee OA POD#1 R TKR        SUBJECTIVE & OBJECTIVE: Pt seen and examined at bedside. nad    PHYSICAL EXAM:  T(C): 36.8 (12-09-23 @ 08:05), Max: 36.8 (12-08-23 @ 23:25)  HR: 82 (12-09-23 @ 08:05) (82 - 101)  BP: 117/71 (12-09-23 @ 08:05) (110/64 - 120/64)  RR: 18 (12-09-23 @ 08:05) (17 - 18)  SpO2: 97% (12-09-23 @ 08:05) (97% - 100%)  Wt(kg): --   GENERAL: NAD, well-groomed, well-developed  HEAD:  Atraumatic, Normocephalic  EYES: EOMI, PERRLA, conjunctiva and sclera clear  ENMT: Moist mucous membranes  NECK: Supple, No JVD  NERVOUS SYSTEM:  Alert & Oriented X3, Motor Strength 5/5 B/L upper and lower extremities; DTRs 2+ intact and symmetric  CHEST/LUNG: Clear to auscultation bilaterally; No rales, rhonchi, wheezing, or rubs  HEART: Regular rate and rhythm; No murmurs, rubs, or gallops  ABDOMEN: Soft, Nontender, Nondistended; Bowel sounds present  EXTREMITIES:  2+ Peripheral Pulses, No clubbing, cyanosis, or edema        MEDICATIONS  (STANDING):  acetaminophen     Tablet .. 1000 milliGRAM(s) Oral every 8 hours  aspirin enteric coated 81 milliGRAM(s) Oral every 12 hours  atorvastatin 20 milliGRAM(s) Oral at bedtime  celecoxib 200 milliGRAM(s) Oral every 12 hours  lactated ringers. 1000 milliLiter(s) (100 mL/Hr) IV Continuous <Continuous>  pantoprazole    Tablet 40 milliGRAM(s) Oral before breakfast  polyethylene glycol 3350 17 Gram(s) Oral at bedtime  senna 2 Tablet(s) Oral at bedtime    MEDICATIONS  (PRN):  HYDROmorphone  Injectable 0.5 milliGRAM(s) IV Push every 3 hours PRN breakthrough pain  magnesium hydroxide Suspension 30 milliLiter(s) Oral daily PRN Constipation  ondansetron Injectable 4 milliGRAM(s) IV Push every 6 hours PRN Nausea and/or Vomiting  oxyCODONE    IR 5 milliGRAM(s) Oral every 3 hours PRN Moderate Pain (4 - 6)  oxyCODONE    IR 10 milliGRAM(s) Oral every 3 hours PRN Severe Pain (7 - 10)  traMADol 50 milliGRAM(s) Oral every 6 hours PRN Moderate Pain (4 - 6)      LABS:                        8.5    9.77  )-----------( 209      ( 08 Dec 2023 15:06 )             26.6                 CAPILLARY BLOOD GLUCOSE          CAPILLARY BLOOD GLUCOSE        CAPILLARY BLOOD GLUCOSE                RECENT CULTURES:      RADIOLOGY & ADDITIONAL TESTS:                        DVT/GI ppx  Discussed with pt @ bedside

## 2023-12-09 NOTE — PROGRESS NOTE ADULT - SUBJECTIVE AND OBJECTIVE BOX
ADELE CLAUDIA  [id number]    Pt is a 68y year old Female s/p right TKR. pain is 3/10. (+) Voids, tolerating regular diet. Denies chest pain/shortness of breath/nausea/vomitting.     Vital Signs Last 24 Hrs  T(C): 36.8 (09 Dec 2023 08:05), Max: 36.8 (08 Dec 2023 23:25)  T(F): 98.2 (09 Dec 2023 08:05), Max: 98.2 (08 Dec 2023 23:25)  HR: 82 (09 Dec 2023 08:05) (82 - 101)  BP: 117/71 (09 Dec 2023 08:05) (110/64 - 120/64)  BP(mean): 79 (08 Dec 2023 15:34) (79 - 79)  RR: 18 (09 Dec 2023 08:05) (17 - 18)  SpO2: 97% (09 Dec 2023 08:05) (97% - 100%)    Parameters below as of 09 Dec 2023 08:05  Patient On (Oxygen Delivery Method): room air        I&O's Detail                            8.5    9.77  )-----------( 209      ( 08 Dec 2023 15:06 )             26.6               PE:   RLE: Dressing changed, incision clean and dry, no erythema or drainage, nylon intact SILT distally, (+2) DP/PT pulses, EHL/FHL/TA intact, Capillary refill < 2 seconds. negative calf tenderness.PAS on,    A: 68y year old Female s/p right TKR POD#3    Plan:   -DVT ppx =  aspirin enteric coated 81 milliGRAM(s) Oral every 12 hours    -PT/OT = OOB  -Pain control   -Medicine to follow   -continue to follow Labs  -continue use of PAS  -Dispo: [facility] today ADELE CLUADIA  [id number]    Pt is a 68y year old Female s/p right TKR. pain is 3/10. (+) Voids, tolerating regular diet. Denies chest pain/shortness of breath/nausea/vomitting.     Vital Signs Last 24 Hrs  T(C): 36.8 (09 Dec 2023 08:05), Max: 36.8 (08 Dec 2023 23:25)  T(F): 98.2 (09 Dec 2023 08:05), Max: 98.2 (08 Dec 2023 23:25)  HR: 82 (09 Dec 2023 08:05) (82 - 101)  BP: 117/71 (09 Dec 2023 08:05) (110/64 - 120/64)  BP(mean): 79 (08 Dec 2023 15:34) (79 - 79)  RR: 18 (09 Dec 2023 08:05) (17 - 18)  SpO2: 97% (09 Dec 2023 08:05) (97% - 100%)    Parameters below as of 09 Dec 2023 08:05  Patient On (Oxygen Delivery Method): room air        I&O's Detail                            8.5    9.77  )-----------( 209      ( 08 Dec 2023 15:06 )             26.6               PE:   RLE: Dressing changed, incision clean and dry, no erythema or drainage, nylon intact SILT distally, (+2) DP/PT pulses, EHL/FHL/TA intact, Capillary refill < 2 seconds. negative calf tenderness.PAS on,    A: 68y year old Female s/p right TKR POD#3    Plan:   -DVT ppx =  aspirin enteric coated 81 milliGRAM(s) Oral every 12 hours    -PT/OT = OOB  -Pain control   -Medicine to follow   -continue to follow Labs  -continue use of PAS  -Dispo: [facility] today

## 2023-12-09 NOTE — PROGRESS NOTE ADULT - ASSESSMENT
68F with HTN, HLD, and R knee OA POD#1 R TKR      1. POD#3 R TKR   PT/OT evals in progress, pt states she wants to go to [facility]   Continue multimodal analgesia, bowel regimen, VTE ppx, IS   Stable for dc from medical standpoint.    2. Leucocytosis,   Likely reactive  resolved     3. HTN   Hold norvasc     4. HLD   Resume atorvastatin 20mg HS on dc

## 2023-12-09 NOTE — SOCIAL WORK PROGRESS NOTE - NSSWPROGRESSNOTE_GEN_ALL_CORE
Per Tx team, pt is medically stable for dc today. Pt is scheduled for dc to [facility] today via lette. Lette set up with Cindy at [facility]. Pt, Tx team and Jeri Kennedy at [facility] aware. Pt was given lette info to confirm payment. Per Tx team, pt is medically stable for dc today. Pt is scheduled for dc to [facility] today via lette. Lette set up with Cindy at [facility]. Pt, Tx team and Jeri Kennedy at [facility] aware. Pt was given lette info to confirm payment.

## 2023-12-10 ENCOUNTER — NON-APPOINTMENT (OUTPATIENT)
Age: 68
End: 2023-12-10

## 2023-12-22 ENCOUNTER — APPOINTMENT (OUTPATIENT)
Dept: ORTHOPEDIC SURGERY | Facility: CLINIC | Age: 68
End: 2023-12-22
Payer: COMMERCIAL

## 2023-12-22 VITALS — WEIGHT: 124 LBS | BODY MASS INDEX: 25 KG/M2 | HEIGHT: 59 IN

## 2023-12-22 PROCEDURE — 99024 POSTOP FOLLOW-UP VISIT: CPT

## 2023-12-22 PROCEDURE — 73560 X-RAY EXAM OF KNEE 1 OR 2: CPT | Mod: RT

## 2023-12-23 NOTE — ADDENDUM
[FreeTextEntry1] :  I, Maria De Jesus Dayan, acted as a scribe on behalf of Dr. Thad Alex on 12/22/2023 .   All medical entries made by the scribe were at my, Dr. Thad Alex, direction and personally dictated by me on 12/22/2023 .. I have reviewed the chart and agree that the record accurately reflects my personal performance of the history, physical exam, assessment and plan. I have also personally directed, reviewed, and agreed with the chart.

## 2023-12-23 NOTE — HISTORY OF PRESENT ILLNESS
[___ Weeks Post Op] : [unfilled] weeks post op [Doing Well] : is doing well [Excellent Pain Control] : has excellent pain control [No Sign of Infection] : is showing no signs of infection [de-identified] : 68 year old female s/p 2 weeks Right total knee arthroplasty on 12/07/2023   [de-identified] : 68 year female presents for a post operative evaluation s/p Right total knee arthroplasty. Patient states she is feeling good post operatively. Patient presents with a cane ambulating. Patient denies fever, chills, nausea or vomiting. Patient is here today for wound check and clinical evaluation. She takes Tylenol for pain. [de-identified] : "Right Lower Extremity o Knee :  Inspection/Palpation : no tenderness to palpation, mild swelling, no deformity, +2 effusion, wound healing well with sutures in place  Range of Motion : 0 - 95 degrees, no crepitus  Stability : not assessed due to injury   Strength :not assessed due to injury  o Muscle Bulk : normal muscle bulk present o Skin : no erythema, no ecchymosis o Sensation : sensation to pin intact o Vascular Exam : no edema, no cyanosis, dorsalis pedis artery pulse 2+, posterior tibial artery pulse 2+ - Superficial skin tear on the right groin   [de-identified] : o Right Knee XR : AP, lateral views of the knee were obtained on 12/22/2023 s/p Right total knee arthroplasty in good position    [de-identified] : The underlying pathophysiology was reviewed in great detail with the patient as well as the various treatment options, including, NSAIDs, Physical therapy,   Physical therapy script given   Sutures were removed and Steri-Strips were placed. She was instructed to allow the Steri-Strips to fall off on their own.	  Celebrex prescription given 		  Insurance paperwork completed    All questions were answered, all alternatives discussed and the patient is in complete agreement with that plan. Follow-up appointment as instructed. Any issues and the patient will call or come in sooner.

## 2024-01-17 RX ORDER — TRAMADOL HYDROCHLORIDE 50 MG/1
50 TABLET, COATED ORAL
Qty: 30 | Refills: 0 | Status: ACTIVE | COMMUNITY
Start: 2024-01-03 | End: 1900-01-01

## 2024-01-24 ENCOUNTER — APPOINTMENT (OUTPATIENT)
Dept: ORTHOPEDIC SURGERY | Facility: CLINIC | Age: 69
End: 2024-01-24
Payer: COMMERCIAL

## 2024-01-24 VITALS — WEIGHT: 119 LBS | BODY MASS INDEX: 23.99 KG/M2 | HEIGHT: 59 IN

## 2024-01-24 PROCEDURE — 99024 POSTOP FOLLOW-UP VISIT: CPT

## 2024-02-12 ENCOUNTER — RX RENEWAL (OUTPATIENT)
Age: 69
End: 2024-02-12

## 2024-03-06 ENCOUNTER — APPOINTMENT (OUTPATIENT)
Dept: ORTHOPEDIC SURGERY | Facility: CLINIC | Age: 69
End: 2024-03-06
Payer: COMMERCIAL

## 2024-03-06 VITALS — HEIGHT: 59 IN | WEIGHT: 119 LBS | BODY MASS INDEX: 23.99 KG/M2

## 2024-03-06 PROCEDURE — 99214 OFFICE O/P EST MOD 30 MIN: CPT

## 2024-03-18 ENCOUNTER — RX RENEWAL (OUTPATIENT)
Age: 69
End: 2024-03-18

## 2024-04-17 ENCOUNTER — RX RENEWAL (OUTPATIENT)
Age: 69
End: 2024-04-17

## 2024-04-19 ENCOUNTER — NON-APPOINTMENT (OUTPATIENT)
Age: 69
End: 2024-04-19

## 2024-05-08 ENCOUNTER — RX RENEWAL (OUTPATIENT)
Age: 69
End: 2024-05-08

## 2024-05-08 RX ORDER — DICLOFENAC SODIUM 75 MG/1
75 TABLET, DELAYED RELEASE ORAL
Qty: 60 | Refills: 1 | Status: ACTIVE | COMMUNITY
Start: 2024-03-06 | End: 1900-01-01

## 2024-05-20 ENCOUNTER — NON-APPOINTMENT (OUTPATIENT)
Age: 69
End: 2024-05-20

## 2024-05-22 ENCOUNTER — RX RENEWAL (OUTPATIENT)
Age: 69
End: 2024-05-22

## 2024-05-22 RX ORDER — CELECOXIB 200 MG/1
200 CAPSULE ORAL
Qty: 60 | Refills: 0 | Status: ACTIVE | COMMUNITY
Start: 2023-12-22 | End: 1900-01-01

## 2024-06-07 ENCOUNTER — APPOINTMENT (OUTPATIENT)
Dept: ORTHOPEDIC SURGERY | Facility: CLINIC | Age: 69
End: 2024-06-07
Payer: COMMERCIAL

## 2024-06-07 VITALS — BODY MASS INDEX: 25 KG/M2 | HEIGHT: 59 IN | WEIGHT: 124 LBS

## 2024-06-07 DIAGNOSIS — M17.0 BILATERAL PRIMARY OSTEOARTHRITIS OF KNEE: ICD-10-CM

## 2024-06-07 DIAGNOSIS — Z96.651 PRESENCE OF RIGHT ARTIFICIAL KNEE JOINT: ICD-10-CM

## 2024-06-07 PROCEDURE — 73562 X-RAY EXAM OF KNEE 3: CPT | Mod: RT

## 2024-06-07 PROCEDURE — 99213 OFFICE O/P EST LOW 20 MIN: CPT

## 2024-07-10 ENCOUNTER — RX RENEWAL (OUTPATIENT)
Age: 69
End: 2024-07-10

## 2024-09-06 ENCOUNTER — RX RENEWAL (OUTPATIENT)
Age: 69
End: 2024-09-06

## 2024-10-09 ENCOUNTER — APPOINTMENT (OUTPATIENT)
Dept: ORTHOPEDIC SURGERY | Facility: CLINIC | Age: 69
End: 2024-10-09
Payer: COMMERCIAL

## 2024-10-09 VITALS — WEIGHT: 126 LBS | BODY MASS INDEX: 25.4 KG/M2 | HEIGHT: 59 IN

## 2024-10-09 DIAGNOSIS — Z96.651 PRESENCE OF RIGHT ARTIFICIAL KNEE JOINT: ICD-10-CM

## 2024-10-09 DIAGNOSIS — M17.0 BILATERAL PRIMARY OSTEOARTHRITIS OF KNEE: ICD-10-CM

## 2024-10-09 PROCEDURE — 99214 OFFICE O/P EST MOD 30 MIN: CPT

## 2024-10-09 PROCEDURE — 73564 X-RAY EXAM KNEE 4 OR MORE: CPT | Mod: LT

## 2024-10-09 PROCEDURE — 73562 X-RAY EXAM OF KNEE 3: CPT | Mod: RT

## 2024-10-23 ENCOUNTER — NON-APPOINTMENT (OUTPATIENT)
Age: 69
End: 2024-10-23

## 2024-11-21 ENCOUNTER — NON-APPOINTMENT (OUTPATIENT)
Age: 69
End: 2024-11-21

## 2024-11-26 ENCOUNTER — OUTPATIENT (OUTPATIENT)
Dept: OUTPATIENT SERVICES | Facility: HOSPITAL | Age: 69
LOS: 1 days | End: 2024-11-26
Payer: COMMERCIAL

## 2024-11-26 VITALS
HEIGHT: 60 IN | DIASTOLIC BLOOD PRESSURE: 78 MMHG | HEART RATE: 73 BPM | OXYGEN SATURATION: 98 % | SYSTOLIC BLOOD PRESSURE: 140 MMHG | TEMPERATURE: 98 F | WEIGHT: 123.46 LBS | RESPIRATION RATE: 18 BRPM

## 2024-11-26 DIAGNOSIS — M17.12 UNILATERAL PRIMARY OSTEOARTHRITIS, LEFT KNEE: ICD-10-CM

## 2024-11-26 DIAGNOSIS — Z96.651 PRESENCE OF RIGHT ARTIFICIAL KNEE JOINT: Chronic | ICD-10-CM

## 2024-11-26 DIAGNOSIS — Z01.818 ENCOUNTER FOR OTHER PREPROCEDURAL EXAMINATION: ICD-10-CM

## 2024-11-26 LAB
A1C WITH ESTIMATED AVERAGE GLUCOSE RESULT: 5.8 % — HIGH (ref 4–5.6)
ALBUMIN SERPL ELPH-MCNC: 4.1 G/DL — SIGNIFICANT CHANGE UP (ref 3.3–5)
ALP SERPL-CCNC: 78 U/L — SIGNIFICANT CHANGE UP (ref 30–120)
ALT FLD-CCNC: 22 U/L — SIGNIFICANT CHANGE UP (ref 10–60)
ANION GAP SERPL CALC-SCNC: 7 MMOL/L — SIGNIFICANT CHANGE UP (ref 5–17)
APTT BLD: 31 SEC — SIGNIFICANT CHANGE UP (ref 24.5–35.6)
AST SERPL-CCNC: 20 U/L — SIGNIFICANT CHANGE UP (ref 10–40)
BILIRUB SERPL-MCNC: 0.3 MG/DL — SIGNIFICANT CHANGE UP (ref 0.2–1.2)
BLD GP AB SCN SERPL QL: SIGNIFICANT CHANGE UP
BUN SERPL-MCNC: 21 MG/DL — SIGNIFICANT CHANGE UP (ref 7–23)
CALCIUM SERPL-MCNC: 9.7 MG/DL — SIGNIFICANT CHANGE UP (ref 8.4–10.5)
CHLORIDE SERPL-SCNC: 105 MMOL/L — SIGNIFICANT CHANGE UP (ref 96–108)
CO2 SERPL-SCNC: 30 MMOL/L — SIGNIFICANT CHANGE UP (ref 22–31)
CREAT SERPL-MCNC: 0.67 MG/DL — SIGNIFICANT CHANGE UP (ref 0.5–1.3)
EGFR: 94 ML/MIN/1.73M2 — SIGNIFICANT CHANGE UP
ESTIMATED AVERAGE GLUCOSE: 120 MG/DL — HIGH (ref 68–114)
GLUCOSE SERPL-MCNC: 110 MG/DL — HIGH (ref 70–99)
HCT VFR BLD CALC: 39.2 % — SIGNIFICANT CHANGE UP (ref 34.5–45)
HGB BLD-MCNC: 12.6 G/DL — SIGNIFICANT CHANGE UP (ref 11.5–15.5)
INR BLD: 0.86 RATIO — SIGNIFICANT CHANGE UP (ref 0.85–1.16)
MCHC RBC-ENTMCNC: 28.7 PG — SIGNIFICANT CHANGE UP (ref 27–34)
MCHC RBC-ENTMCNC: 32.1 G/DL — SIGNIFICANT CHANGE UP (ref 32–36)
MCV RBC AUTO: 89.3 FL — SIGNIFICANT CHANGE UP (ref 80–100)
NRBC # BLD: 0 /100 WBCS — SIGNIFICANT CHANGE UP (ref 0–0)
PLATELET # BLD AUTO: 292 K/UL — SIGNIFICANT CHANGE UP (ref 150–400)
POTASSIUM SERPL-MCNC: 4.4 MMOL/L — SIGNIFICANT CHANGE UP (ref 3.5–5.3)
POTASSIUM SERPL-SCNC: 4.4 MMOL/L — SIGNIFICANT CHANGE UP (ref 3.5–5.3)
PROT SERPL-MCNC: 7.5 G/DL — SIGNIFICANT CHANGE UP (ref 6–8.3)
PROTHROM AB SERPL-ACNC: 10.2 SEC — SIGNIFICANT CHANGE UP (ref 9.9–13.4)
RBC # BLD: 4.39 M/UL — SIGNIFICANT CHANGE UP (ref 3.8–5.2)
RBC # FLD: 13.2 % — SIGNIFICANT CHANGE UP (ref 10.3–14.5)
SODIUM SERPL-SCNC: 142 MMOL/L — SIGNIFICANT CHANGE UP (ref 135–145)
WBC # BLD: 4.73 K/UL — SIGNIFICANT CHANGE UP (ref 3.8–10.5)
WBC # FLD AUTO: 4.73 K/UL — SIGNIFICANT CHANGE UP (ref 3.8–10.5)

## 2024-11-26 PROCEDURE — 36415 COLL VENOUS BLD VENIPUNCTURE: CPT

## 2024-11-26 PROCEDURE — 93010 ELECTROCARDIOGRAM REPORT: CPT

## 2024-11-26 PROCEDURE — 85610 PROTHROMBIN TIME: CPT

## 2024-11-26 PROCEDURE — G0463: CPT

## 2024-11-26 PROCEDURE — 86901 BLOOD TYPING SEROLOGIC RH(D): CPT

## 2024-11-26 PROCEDURE — 85730 THROMBOPLASTIN TIME PARTIAL: CPT

## 2024-11-26 PROCEDURE — 86850 RBC ANTIBODY SCREEN: CPT

## 2024-11-26 PROCEDURE — 83036 HEMOGLOBIN GLYCOSYLATED A1C: CPT

## 2024-11-26 PROCEDURE — 86900 BLOOD TYPING SEROLOGIC ABO: CPT

## 2024-11-26 PROCEDURE — 85027 COMPLETE CBC AUTOMATED: CPT

## 2024-11-26 PROCEDURE — 80053 COMPREHEN METABOLIC PANEL: CPT

## 2024-11-26 PROCEDURE — 93005 ELECTROCARDIOGRAM TRACING: CPT

## 2024-11-26 NOTE — H&P PST ADULT - PROBLEM SELECTOR PLAN 1
scheduled for left total knee replacement  will obtain medical clearance   pre op instructions provided

## 2024-11-26 NOTE — H&P PST ADULT - HISTORY OF PRESENT ILLNESS
This is a 70 y/o female  presents with left knee pain ,Reports intermittent pain and worse pain when walking ,standing for long period time. Aleve  PRN for pain with mild relief , scheduled for left total  knee replacement on 12/10/2024

## 2024-11-26 NOTE — H&P PST ADULT - NSICDXPASTMEDICALHX_GEN_ALL_CORE_FT
PAST MEDICAL HISTORY:  HLD (hyperlipidemia)     HTN (hypertension)     Left knee pain     Osteoarthritis of left knee     Osteoarthritis of right knee

## 2024-12-04 ENCOUNTER — NON-APPOINTMENT (OUTPATIENT)
Age: 69
End: 2024-12-04

## 2024-12-06 PROBLEM — M17.12 UNILATERAL PRIMARY OSTEOARTHRITIS, LEFT KNEE: Chronic | Status: ACTIVE | Noted: 2024-11-26

## 2024-12-09 RX ORDER — TRANEXAMIC ACID 100 MG/ML
1000 INJECTION INTRAVENOUS ONCE
Refills: 0 | Status: COMPLETED | OUTPATIENT
Start: 2024-12-10 | End: 2024-12-10

## 2024-12-09 RX ORDER — ACETAMINOPHEN 500MG 500 MG/1
1000 TABLET, COATED ORAL ONCE
Refills: 0 | Status: COMPLETED | OUTPATIENT
Start: 2024-12-10 | End: 2024-12-10

## 2024-12-09 RX ORDER — APREPITANT 40 MG/1
40 CAPSULE ORAL ONCE
Refills: 0 | Status: COMPLETED | OUTPATIENT
Start: 2024-12-10 | End: 2024-12-10

## 2024-12-09 RX ORDER — CHLORHEXIDINE GLUCONATE 1.2 MG/ML
1 RINSE ORAL ONCE
Refills: 0 | Status: COMPLETED | OUTPATIENT
Start: 2024-12-10 | End: 2024-12-10

## 2024-12-09 RX ORDER — CEFAZOLIN SODIUM 10 G
2000 VIAL (EA) INJECTION ONCE
Refills: 0 | Status: COMPLETED | OUTPATIENT
Start: 2024-12-10 | End: 2024-12-10

## 2024-12-10 ENCOUNTER — APPOINTMENT (OUTPATIENT)
Dept: ORTHOPEDIC SURGERY | Facility: HOSPITAL | Age: 69
End: 2024-12-10

## 2024-12-10 ENCOUNTER — TRANSCRIPTION ENCOUNTER (OUTPATIENT)
Age: 69
End: 2024-12-10

## 2024-12-10 ENCOUNTER — RESULT REVIEW (OUTPATIENT)
Age: 69
End: 2024-12-10

## 2024-12-10 ENCOUNTER — INPATIENT (INPATIENT)
Facility: HOSPITAL | Age: 69
LOS: 1 days | Discharge: HOME | DRG: 470 | End: 2024-12-12
Attending: ORTHOPAEDIC SURGERY | Admitting: ORTHOPAEDIC SURGERY
Payer: COMMERCIAL

## 2024-12-10 VITALS
WEIGHT: 127.21 LBS | OXYGEN SATURATION: 99 % | HEIGHT: 59 IN | TEMPERATURE: 98 F | SYSTOLIC BLOOD PRESSURE: 142 MMHG | RESPIRATION RATE: 19 BRPM | DIASTOLIC BLOOD PRESSURE: 75 MMHG | HEART RATE: 89 BPM

## 2024-12-10 DIAGNOSIS — M17.12 UNILATERAL PRIMARY OSTEOARTHRITIS, LEFT KNEE: ICD-10-CM

## 2024-12-10 DIAGNOSIS — Z96.651 PRESENCE OF RIGHT ARTIFICIAL KNEE JOINT: Chronic | ICD-10-CM

## 2024-12-10 PROBLEM — M25.562 PAIN IN LEFT KNEE: Chronic | Status: ACTIVE | Noted: 2024-11-26

## 2024-12-10 PROCEDURE — 99222 1ST HOSP IP/OBS MODERATE 55: CPT

## 2024-12-10 PROCEDURE — 73560 X-RAY EXAM OF KNEE 1 OR 2: CPT | Mod: 26,LT

## 2024-12-10 DEVICE — PIN CAS FIX 3.2X150MM: Type: IMPLANTABLE DEVICE | Site: LEFT | Status: FUNCTIONAL

## 2024-12-10 DEVICE — IMPLANTABLE DEVICE: Type: IMPLANTABLE DEVICE | Site: LEFT | Status: FUNCTIONAL

## 2024-12-10 DEVICE — TIB PSN NP STM 5 DEG SZ DL: Type: IMPLANTABLE DEVICE | Site: LEFT | Status: FUNCTIONAL

## 2024-12-10 DEVICE — BONE WAX 2.5GM: Type: IMPLANTABLE DEVICE | Site: LEFT | Status: FUNCTIONAL

## 2024-12-10 DEVICE — PATELLA  ALL POLY VE 32MM: Type: IMPLANTABLE DEVICE | Site: LEFT | Status: FUNCTIONAL

## 2024-12-10 DEVICE — SCREW HEX HEADED 3.5X48MM: Type: IMPLANTABLE DEVICE | Site: LEFT | Status: FUNCTIONAL

## 2024-12-10 DEVICE — ZIMMER FEMALE HEX SCREW MAGNETIC 2.5MM X 25MM: Type: IMPLANTABLE DEVICE | Site: LEFT | Status: FUNCTIONAL

## 2024-12-10 DEVICE — PIN FIX CAS 3.2X80MM STR: Type: IMPLANTABLE DEVICE | Site: LEFT | Status: FUNCTIONAL

## 2024-12-10 DEVICE — ZIMMER/NEXGEN SMOOTH PIN 3.2X75MM: Type: IMPLANTABLE DEVICE | Site: LEFT | Status: FUNCTIONAL

## 2024-12-10 DEVICE — FEM PERSONA PS CMT CCR NRW SZ 5 L: Type: IMPLANTABLE DEVICE | Site: LEFT | Status: FUNCTIONAL

## 2024-12-10 RX ORDER — HYDROMORPHONE HYDROCHLORIDE 2 MG/1
0.5 TABLET ORAL
Refills: 0 | Status: DISCONTINUED | OUTPATIENT
Start: 2024-12-10 | End: 2024-12-12

## 2024-12-10 RX ORDER — PANTOPRAZOLE SODIUM 40 MG/1
40 TABLET, DELAYED RELEASE ORAL
Refills: 0 | Status: DISCONTINUED | OUTPATIENT
Start: 2024-12-11 | End: 2024-12-12

## 2024-12-10 RX ORDER — HYDROMORPHONE HYDROCHLORIDE 2 MG/1
0.5 TABLET ORAL
Refills: 0 | Status: DISCONTINUED | OUTPATIENT
Start: 2024-12-10 | End: 2024-12-10

## 2024-12-10 RX ORDER — HYDROMORPHONE HYDROCHLORIDE 2 MG/1
0.2 TABLET ORAL
Refills: 0 | Status: DISCONTINUED | OUTPATIENT
Start: 2024-12-10 | End: 2024-12-10

## 2024-12-10 RX ORDER — 0.9 % SODIUM CHLORIDE 0.9 %
1000 INTRAVENOUS SOLUTION INTRAVENOUS
Refills: 0 | Status: DISCONTINUED | OUTPATIENT
Start: 2024-12-10 | End: 2024-12-12

## 2024-12-10 RX ORDER — ONDANSETRON HYDROCHLORIDE 4 MG/1
4 TABLET, FILM COATED ORAL EVERY 6 HOURS
Refills: 0 | Status: DISCONTINUED | OUTPATIENT
Start: 2024-12-10 | End: 2024-12-12

## 2024-12-10 RX ORDER — CEFAZOLIN SODIUM 10 G
2000 VIAL (EA) INJECTION EVERY 8 HOURS
Refills: 0 | Status: COMPLETED | OUTPATIENT
Start: 2024-12-10 | End: 2024-12-11

## 2024-12-10 RX ORDER — AMLODIPINE BESYLATE 10 MG/1
2.5 TABLET ORAL DAILY
Refills: 0 | Status: DISCONTINUED | OUTPATIENT
Start: 2024-12-12 | End: 2024-12-12

## 2024-12-10 RX ORDER — SODIUM CHLORIDE 9 MG/ML
500 INJECTION, SOLUTION INTRAMUSCULAR; INTRAVENOUS; SUBCUTANEOUS ONCE
Refills: 0 | Status: COMPLETED | OUTPATIENT
Start: 2024-12-10 | End: 2024-12-10

## 2024-12-10 RX ORDER — POLYETHYLENE GLYCOL 3350 17 G/17G
17 POWDER, FOR SOLUTION ORAL AT BEDTIME
Refills: 0 | Status: DISCONTINUED | OUTPATIENT
Start: 2024-12-10 | End: 2024-12-12

## 2024-12-10 RX ORDER — ACETAMINOPHEN 500MG 500 MG/1
1000 TABLET, COATED ORAL EVERY 8 HOURS
Refills: 0 | Status: DISCONTINUED | OUTPATIENT
Start: 2024-12-11 | End: 2024-12-12

## 2024-12-10 RX ORDER — 0.9 % SODIUM CHLORIDE 0.9 %
1000 INTRAVENOUS SOLUTION INTRAVENOUS
Refills: 0 | Status: DISCONTINUED | OUTPATIENT
Start: 2024-12-10 | End: 2024-12-10

## 2024-12-10 RX ORDER — OXYCODONE HYDROCHLORIDE 30 MG/1
5 TABLET ORAL
Refills: 0 | Status: DISCONTINUED | OUTPATIENT
Start: 2024-12-10 | End: 2024-12-12

## 2024-12-10 RX ORDER — ACETAMINOPHEN 500MG 500 MG/1
1000 TABLET, COATED ORAL ONCE
Refills: 0 | Status: COMPLETED | OUTPATIENT
Start: 2024-12-10 | End: 2024-12-10

## 2024-12-10 RX ORDER — DEXAMETHASONE 1.5 MG/1
8 TABLET ORAL ONCE
Refills: 0 | Status: COMPLETED | OUTPATIENT
Start: 2024-12-11 | End: 2024-12-11

## 2024-12-10 RX ORDER — OXYCODONE HYDROCHLORIDE 30 MG/1
10 TABLET ORAL
Refills: 0 | Status: DISCONTINUED | OUTPATIENT
Start: 2024-12-10 | End: 2024-12-12

## 2024-12-10 RX ORDER — CELECOXIB 200 MG/1
200 CAPSULE ORAL EVERY 12 HOURS
Refills: 0 | Status: DISCONTINUED | OUTPATIENT
Start: 2024-12-10 | End: 2024-12-12

## 2024-12-10 RX ORDER — ONDANSETRON HYDROCHLORIDE 4 MG/1
4 TABLET, FILM COATED ORAL ONCE
Refills: 0 | Status: DISCONTINUED | OUTPATIENT
Start: 2024-12-10 | End: 2024-12-10

## 2024-12-10 RX ORDER — SENNOSIDES 8.6 MG
2 TABLET ORAL AT BEDTIME
Refills: 0 | Status: DISCONTINUED | OUTPATIENT
Start: 2024-12-10 | End: 2024-12-12

## 2024-12-10 RX ADMIN — Medication 75 MILLILITER(S): at 14:21

## 2024-12-10 RX ADMIN — Medication 100 MILLIGRAM(S): at 18:42

## 2024-12-10 RX ADMIN — CHLORHEXIDINE GLUCONATE 1 APPLICATION(S): 1.2 RINSE ORAL at 08:31

## 2024-12-10 RX ADMIN — ACETAMINOPHEN 500MG 400 MILLIGRAM(S): 500 TABLET, COATED ORAL at 16:46

## 2024-12-10 RX ADMIN — Medication 20 MILLIGRAM(S): at 21:39

## 2024-12-10 RX ADMIN — SODIUM CHLORIDE 500 MILLILITER(S): 9 INJECTION, SOLUTION INTRAMUSCULAR; INTRAVENOUS; SUBCUTANEOUS at 14:20

## 2024-12-10 RX ADMIN — ACETAMINOPHEN 500MG 400 MILLIGRAM(S): 500 TABLET, COATED ORAL at 22:54

## 2024-12-10 RX ADMIN — CELECOXIB 200 MILLIGRAM(S): 200 CAPSULE ORAL at 21:39

## 2024-12-10 RX ADMIN — APREPITANT 40 MILLIGRAM(S): 40 CAPSULE ORAL at 08:31

## 2024-12-10 RX ADMIN — ACETAMINOPHEN 500MG 1000 MILLIGRAM(S): 500 TABLET, COATED ORAL at 17:00

## 2024-12-10 RX ADMIN — SODIUM CHLORIDE 500 MILLILITER(S): 9 INJECTION, SOLUTION INTRAMUSCULAR; INTRAVENOUS; SUBCUTANEOUS at 21:40

## 2024-12-10 RX ADMIN — Medication 100 MILLILITER(S): at 16:47

## 2024-12-10 RX ADMIN — CELECOXIB 200 MILLIGRAM(S): 200 CAPSULE ORAL at 22:15

## 2024-12-10 NOTE — CONSULT NOTE ADULT - SUBJECTIVE AND OBJECTIVE BOX
HPI:  70 yo female, pmh of htn, hld, OA, presenting for left knee replacement. Seen in pacu, able to move lower extremities denied pain, dizziness, nausea, vomiting, headaches    PAST MEDICAL & SURGICAL HISTORY:  HTN (hypertension)      HLD (hyperlipidemia)      Osteoarthritis of right knee      Left knee pain      Osteoarthritis of left knee      H/O total knee replacement, right          ANTIMICROBIAL:    CARDIOVASCULAR:    PULMONARY:    NEUROLOGIC:  HYDROmorphone  Injectable 0.2 milliGRAM(s) IV Push every 10 minutes PRN  HYDROmorphone  Injectable 0.5 milliGRAM(s) IV Push every 10 minutes PRN  ondansetron Injectable 4 milliGRAM(s) IV Push once PRN    ONCOLOGIC:    HEMATOLOGIC:    GATROINTESTINAL:     MEDS:    ENDO/METABOLIC:  atorvastatin 20 milliGRAM(s) Oral at bedtime    IV FLUID/NUTRITION:  lactated ringers. 1000 milliLiter(s) IV Continuous <Continuous>    TOPICAL:    IMMUNOLOGIC & OTHER        Allergies    No Known Allergies    Intolerances        SOCIAL HISTORY:  rare etoh, no smoking  FAMILY HISTORY:  no family history of complications with anesthesia    Vital Signs Last 24 Hrs  T(C): 36.4 (10 Dec 2024 13:30), Max: 36.5 (10 Dec 2024 08:33)  T(F): 97.6 (10 Dec 2024 13:30), Max: 97.7 (10 Dec 2024 08:33)  HR: 80 (10 Dec 2024 14:30) (78 - 89)  BP: 128/60 (10 Dec 2024 14:30) (101/80 - 142/75)  BP(mean): --  RR: 16 (10 Dec 2024 14:30) (16 - 19)  SpO2: 97% (10 Dec 2024 14:30) (97% - 100%)    Parameters below as of 10 Dec 2024 14:15  Patient On (Oxygen Delivery Method): room air          I&O's Detail    10 Dec 2024 07:01  -  10 Dec 2024 15:00  --------------------------------------------------------  IN:    Lactated Ringers: 2200 mL    Oral Fluid: 240 mL    Sodium Chloride 0.9% Bolus: 500 mL  Total IN: 2940 mL    OUT:    Blood Loss (mL): 100 mL    Voided (mL): 500 mL  Total OUT: 600 mL    Total NET: 2340 mL        Daily Height in cm: 149.86 (10 Dec 2024 08:33)    Daily     REVIEW OF SYSTEMS:  as per hpi, other systems reviewed and are negative    PHYSICAL EXAM:    GENERAL: NAD, well-groomed, well-developed  HEAD:  Atraumatic, Normocephalic  EYES: EOMI, PERRLA, conjunctiva and sclera clear  ENMT: No tonsillar erythema, exudates, or enlargement; Moist mucous membranes, No lesions  NECK: Supple, No JVD,   NERVOUS SYSTEM:  Alert & Oriented X3, Good concentration; no focal deficits  CHEST/LUNG: Clear to auscultation bilaterally; No rales, rhonchi, wheezing, or rubs  HEART: Regular rate and rhythm; No murmurs, rubs, or gallops  ABDOMEN: Soft, Nontender, Nondistended; Bowel sounds present  EXTREMITIES:  2+ Peripheral Pulses, left knee wrapped in ace  LYMPH: No lymphadenopathy   SKIN: No rashes or lesions      LABS:      Reviewed presurgical H+P, presurgical labs                 RADIOLOGY & ADDITIONAL STUDIES:

## 2024-12-10 NOTE — DISCHARGE NOTE PROVIDER - CARE PROVIDER_API CALL
Thad Alex  Orthopaedic Surgery  [address], [address]  [place] [address]  Phone: [phone]  Fax: [phone]  Established Patient  Follow Up Time: 2 weeks

## 2024-12-10 NOTE — DISCHARGE NOTE PROVIDER - NSDCMRMEDTOKEN_GEN_ALL_CORE_FT
amLODIPine 2.5 mg oral tablet: 1 tab(s) orally once a day  atorvastatin 20 mg oral tablet: 1 tab(s) orally once a day (at bedtime)  mupirocin 2% topical ointment: Apply topically to affected area 2 times a day Apply to both nostrils 2x day for 5 days starting from 12/3/24   amLODIPine 2.5 mg oral tablet: 1 tab(s) orally once a day  atorvastatin 20 mg oral tablet: 1 tab(s) orally once a day (at bedtime)   acetaminophen 500 mg oral tablet: 2 tab(s) orally every 8 hours  amLODIPine 2.5 mg oral tablet: 1 tab(s) orally once a day  aspirin 81 mg oral delayed release tablet: 1 tab(s) orally every 12 hours  atorvastatin 20 mg oral tablet: 1 tab(s) orally once a day (at bedtime)  celecoxib 200 mg oral capsule: 1 cap(s) orally every 12 hours  oxyCODONE 10 mg oral tablet: 1 tab(s) orally every 3 hours As needed Severe Pain (7 - 10)  oxyCODONE 5 mg oral tablet: 1 tab(s) orally every 3 hours As needed Moderate Pain (4 - 6)  pantoprazole 40 mg oral delayed release tablet: 1 tab(s) orally once a day (before a meal)  polyethylene glycol 3350 oral powder for reconstitution: 17 gram(s) orally once a day (at bedtime)  senna leaf extract oral tablet: 2 tab(s) orally once a day (at bedtime)   acetaminophen 500 mg oral tablet: 2 tab(s) orally every 8 hours  amLODIPine 2.5 mg oral tablet: 1 tab(s) orally once a day  aspirin 81 mg oral delayed release tablet: 1 tab(s) orally every 12 hours  atorvastatin 20 mg oral tablet: 1 tab(s) orally once a day (at bedtime)  celecoxib 200 mg oral capsule: 1 cap(s) orally every 12 hours  celecoxib 200 mg oral capsule: 1 cap(s) orally every 12 hours Take starting at 9AM daily with Food  Ecotrin Adult Low Strength 81 mg oral delayed release tablet: 1 tab(s) orally every 12 hours Tahe starting at 7Am Daily with food  omeprazole 20 mg oral delayed release capsule: 1 cap(s) orally once a day  oxyCODONE 10 mg oral tablet: 1 tab(s) orally every 3 hours As needed Severe Pain (7 - 10)  oxyCODONE 5 mg oral tablet: 1 tab(s) orally every 3 hours As needed Moderate Pain (4 - 6)  oxyCODONE 5 mg oral tablet: 1 tab(s) orally every 4 hours as needed for  moderate pain 2 Tabs Every  Hours As needed For Severe Knee Pain MDD: 5  pantoprazole 40 mg oral delayed release tablet: 1 tab(s) orally once a day (before a meal)  polyethylene glycol 3350 oral powder for reconstitution: 17 gram(s) orally once a day (at bedtime)  senna leaf extract oral tablet: 2 tab(s) orally once a day (at bedtime)   acetaminophen 500 mg oral tablet: 2 tab(s) orally every 8 hours  amLODIPine 2.5 mg oral tablet: 1 tab(s) orally once a day  aspirin 81 mg oral delayed release tablet: 1 tab(s) orally every 12 hours  atorvastatin 20 mg oral tablet: 1 tab(s) orally once a day (at bedtime)  celecoxib 200 mg oral capsule: 1 cap(s) orally every 12 hours Take starting at 9AM daily with Food  Ecotrin Adult Low Strength 81 mg oral delayed release tablet: 1 tab(s) orally every 12 hours Tahe starting at 7Am Daily with food  omeprazole 20 mg oral delayed release capsule: 1 cap(s) orally once a day  oxyCODONE 5 mg oral tablet: 1 tab(s) orally every 4 hours as needed for  moderate pain 2 Tabs Every  Hours As needed For Severe Knee Pain MDD: 5  pantoprazole 40 mg oral delayed release tablet: 1 tab(s) orally once a day (before a meal)  polyethylene glycol 3350 oral powder for reconstitution: 17 gram(s) orally once a day (at bedtime)  senna leaf extract oral tablet: 2 tab(s) orally once a day (at bedtime)

## 2024-12-10 NOTE — DISCHARGE NOTE PROVIDER - NSDCACTIVITY_GEN_ALL_CORE
Showering allowed/Stairs allowed/Walking - Indoors allowed/Walking - Outdoors allowed/Activity as tolerated Do not drive or operate machinery/Showering allowed/Stairs allowed/Walking - Indoors allowed/No heavy lifting/straining/Walking - Outdoors allowed/Follow Instructions Provided by your Surgical Team/Activity as tolerated

## 2024-12-10 NOTE — PHYSICAL THERAPY INITIAL EVALUATION ADULT - GAIT DISTANCE, PT EVAL
What Is The Reason For Today's Visit?: Full Body Skin Examination What Is The Reason For Today's Visit? (Being Monitored For X): concerning skin lesions on a periodic basis How Severe Are Your Spot(S)?: mild 15 feet

## 2024-12-10 NOTE — DISCHARGE NOTE PROVIDER - HOSPITAL COURSE
This patient was admitted to [facility] with a history of severe degenerative joint disease of the ********.  Patient went to Pre-Surgical Testing at [facility] and was medically cleared to undergo elective procedure. Patient underwent XXXX by Dr. VAZQUEZ. Procedure was well tolerated.  No operative or anushka-operative complications arose during patients hospital course.  Patient received antibiotic according to SCIP guidelines for infection prevention.  ****** was given for DVT prophylaxis.  Anesthesia, Medical Hospitalist, Physical Therapy and Occupational Therapy were consulted. Patient is stable for discharge with a good prognosis.  Appropriate discharge instructions and medications are provided in this document. This patient was admitted to [facility] with a history of severe degenerative joint disease of the left knee.  Patient went to Pre-Surgical Testing at [facility] and was medically cleared to undergo elective procedure. Patient underwent left total knee replacement by Dr. Alex. Procedure was well tolerated.  No operative or anushka-operative complications arose during patients hospital course.  Patient received antibiotic according to SCIP guidelines for infection prevention.  Ecotrin 81mg every 12 hourse was given for DVT prophylaxis.  Anesthesia, Medical Hospitalist, Physical Therapy and Occupational Therapy were consulted. Patient is stable for discharge with a good prognosis.  Appropriate discharge instructions and medications are provided in this document.

## 2024-12-10 NOTE — ASU PATIENT PROFILE, ADULT - ANESTHESIA, PREVIOUS REACTION, PROFILE
[facility] PARENT FORM    Parent Name: Deidra Contreras    Scales T Score   Externalizing Problems    Hyperactivity 41   Aggression 61*   Conduct Problems 64*   Internalizing Problems    Anxiety 69**   Depression 61*   Somatization 58   Behavioral Symptoms Index    Attention Problems 58   Atypicality 45   Withdrawal 48   Adaptive Skills    Adaptability  41*   Social Skills 47   Leadership 44   Functional Communication 46   Activities of Daily Living 43   Composites    Externalizing Problems 56   Internalizing Problems 65*   Behavioral Symptoms Index 53   Adaptive Skills 43       Anger Control 53   Bullying 64*   Developmental Social Disorders 51   Emotional Self Control 59   Executive Functioning 56   Negative Emotionality 62*   Resiliency 44       ADHD Probability  58   Autism Probability 51   EBD Probability 57   Functional Impairment  56       Validity Index Summary    F Index Acceptable   Response Pattern Acceptable   Consistency Caution     *At Risk  ** Clinically Significant    Strengths reported by parents: Zheng is a powerful person. She is brave, resilient, and confident in many areas of her life. She is a good friend and is committed to those she feels she can trust. Zheng is aware of her emotions.  Concerns reported by parents: In the past 12-18 months, Zheng has started to experience anxiety and (perhaps) panic attacks. She has fears of being alone or  from her trusted people (friends and mom).     none

## 2024-12-10 NOTE — PHYSICAL THERAPY INITIAL EVALUATION ADULT - IMPAIRED TRANSFERS: SIT/STAND, REHAB EVAL
Protocol For Photochemotherapy: Baby Oil And Nbuvb: The patient received Photochemotherapy: Baby Oil and NBUVB (baby oil applied to all lesions prior to phototherapy). L LE numbness/impaired motor control/decreased ROM/decreased sensation/decreased strength

## 2024-12-10 NOTE — DISCHARGE NOTE PROVIDER - NSDCCPCAREPLAN_GEN_ALL_CORE_FT
PRINCIPAL DISCHARGE DIAGNOSIS  Diagnosis: Left knee DJD  Assessment and Plan of Treatment:      PRINCIPAL DISCHARGE DIAGNOSIS  Diagnosis: Left knee DJD  Assessment and Plan of Treatment: For your total knee replacement:  Physical Therapy/Occupational Therapy for: ambulation, transfers, stairs, ADL's (activities of daily living), range of motion exercises, and isometrics  -Activity  • Weight Bearing as tolerated with rolling walker.  • Cryo/cuff 30 minutes several times daily with at least a 1 hour break in between icing sessions  • Take short, frequent walks increasing the distance that you walk each day as tolerated.  • Change your position every hour to decrease pain and stiffness.  • Continue the exercises taught to you by your physical therapist.  • No driving until cleared by the doctor.  • No tub baths, hot tubs, or swimming pools until instructed by your doctor.  • Do not squat down on the floor.  • Do not kneel or twist your knee.  • Range of Motion Goals: Flexion= 120 degrees, Extension = 0 degrees  You have a Mepilex dressing. You may shower. Mepilex dressing is water resistant, not waterproof. Do not aim shower stream at surgical site.  Pat dry after showering.  Remove Mepilex dressing in 1 week. You have steristrips over your incision which will fall off on their own.  Keep incision clean. DO NOT APPLY ANYTHING to incision site (salves/ointments/creams).  May shower.  Do not scrub incision site. Pat dry after shower.     PRINCIPAL DISCHARGE DIAGNOSIS  Diagnosis: Left knee DJD  Assessment and Plan of Treatment: For your total knee replacement:  Physical Therapy/Occupational Therapy for: ambulation, transfers, stairs, ADL's (activities of daily living), range of motion exercises, and isometrics  -Activity  • Weight Bearing as tolerated with rolling walker.  • Cryo/cuff 30 minutes several times daily with at least a 1 hour break in between icing sessions  • Take short, frequent walks increasing the distance that you walk each day as tolerated.  • Change your position every hour to decrease pain and stiffness.  • Continue the exercises taught to you by your physical therapist.  • No driving until cleared by the doctor.  • No tub baths, hot tubs, or swimming pools until instructed by your doctor.  • Do not squat down on the floor.  • Do not kneel or twist your knee.  • Range of Motion Goals: Flexion= 120 degrees, Extension = 0 degrees  You have a Mepilex dressing. You may shower. Mepilex dressing is water resistant, not waterproof. Do not aim shower stream at surgical site.  Pat dry after showering.  Remove Mepilex dressing in 1 week. You have steristrips over your incision which will fall off on their own.  Keep incision clean. DO NOT APPLY ANYTHING to incision site (salves/ointments/creams).  May shower.  Do not scrub incision site. Pat dry after shower.  - 1st Routine office visit with Surgeon as scheduled/  = See your PCP on 2 weeks for exam, blood tests, review of medication.  - Call the Surgeon for severe knee pain, new redness or drainage from incision, fall or Left leg injury.

## 2024-12-10 NOTE — PHYSICAL THERAPY INITIAL EVALUATION ADULT - IMPAIRMENTS CONTRIBUTING TO GAIT DEVIATIONS, PT EVAL
L LE numbness/impaired sensation/impaired motor control/decreased ROM/decreased sensation/decreased strength

## 2024-12-10 NOTE — PHYSICAL THERAPY INITIAL EVALUATION ADULT - ADDITIONAL COMMENTS
Pt resides in pvt home with family, states not available to assist upon d/c. Pt states 6STE +HR, denies stairs within. Pt has cane, needs RW. Pt has a walk-in shower. Pt reports was independent prior to admission with use of cane.

## 2024-12-10 NOTE — DISCHARGE NOTE PROVIDER - NSDCFUSCHEDAPPT_GEN_ALL_CORE_FT
Thad Alex  [facility]  ORTHOSURG [address] [facility]  Scheduled Appointment: 12/10/2024    Thad Alex  [facility] PreAdmits.  Scheduled Appointment: 12/15/2024     Thad Alex  [facility] PreAdmits.  Scheduled Appointment: 12/15/2024

## 2024-12-10 NOTE — DISCHARGE NOTE PROVIDER - NSDCCPTREATMENT_GEN_ALL_CORE_FT
PRINCIPAL PROCEDURE  Procedure: Left total knee replacement  Findings and Treatment: Mepilex [place] dressing is over your surgical incision.  It is waterproof and you may shower.  Do not aim shower stream at surgical site and pat dry with clean towel after showering.   Remove Mepilex dressing 7 days after surgery and leave wound open to air.     PRINCIPAL PROCEDURE  Procedure: Left total knee replacement  Findings and Treatment:

## 2024-12-10 NOTE — ASU PATIENT PROFILE, ADULT - FALL HARM RISK - UNIVERSAL INTERVENTIONS
Bed in lowest position, wheels locked, appropriate side rails in place/Call bell, personal items and telephone in reach/Instruct patient to call for assistance before getting out of bed or chair/Non-slip footwear when patient is out of bed/[place] to call system/Physically safe environment - no spills, clutter or unnecessary equipment/Purposeful Proactive Rounding/Room/bathroom lighting operational, light cord in reach

## 2024-12-10 NOTE — PHYSICAL THERAPY INITIAL EVALUATION ADULT - MANUAL MUSCLE TESTING RESULTS, REHAB EVAL
generalized weakness secondary to surgery/grossly assessed due to generalized weakness secondary to surgery L LE grossly 3-/5; B UE R LE grossly >=3+/5/grossly assessed due to

## 2024-12-10 NOTE — CONSULT NOTE ADULT - ASSESSMENT
68 yo female, pmh of htn, hld, OA, presenting for left knee replacement.      Left knee replacement  - pain control - standing tylenol 1000 q8h,  - celebrex 200 bid  oxycodone 5/10 prn for mod/severe pain, dilaudid iv for breakthrough pain  - bowel regimen   - aspirin bid for dvt ppx   - pt/ot  - ivf      HTN - hold home norvasc for now, monitor bp, loose control acceptable to prevent dizziness    hld- continue lipitor    will follow

## 2024-12-11 LAB
ANION GAP SERPL CALC-SCNC: 7 MMOL/L — SIGNIFICANT CHANGE UP (ref 5–17)
BUN SERPL-MCNC: 9 MG/DL — SIGNIFICANT CHANGE UP (ref 7–23)
CALCIUM SERPL-MCNC: 8.8 MG/DL — SIGNIFICANT CHANGE UP (ref 8.4–10.5)
CHLORIDE SERPL-SCNC: 107 MMOL/L — SIGNIFICANT CHANGE UP (ref 96–108)
CO2 SERPL-SCNC: 27 MMOL/L — SIGNIFICANT CHANGE UP (ref 22–31)
CREAT SERPL-MCNC: 0.69 MG/DL — SIGNIFICANT CHANGE UP (ref 0.5–1.3)
EGFR: 93 ML/MIN/1.73M2 — SIGNIFICANT CHANGE UP
GLUCOSE SERPL-MCNC: 145 MG/DL — HIGH (ref 70–99)
HCT VFR BLD CALC: 28.5 % — LOW (ref 34.5–45)
HGB BLD-MCNC: 9.4 G/DL — LOW (ref 11.5–15.5)
MCHC RBC-ENTMCNC: 28.9 PG — SIGNIFICANT CHANGE UP (ref 27–34)
MCHC RBC-ENTMCNC: 33 G/DL — SIGNIFICANT CHANGE UP (ref 32–36)
MCV RBC AUTO: 87.7 FL — SIGNIFICANT CHANGE UP (ref 80–100)
NRBC # BLD: 0 /100 WBCS — SIGNIFICANT CHANGE UP (ref 0–0)
PLATELET # BLD AUTO: 273 K/UL — SIGNIFICANT CHANGE UP (ref 150–400)
POTASSIUM SERPL-MCNC: 4.5 MMOL/L — SIGNIFICANT CHANGE UP (ref 3.5–5.3)
POTASSIUM SERPL-SCNC: 4.5 MMOL/L — SIGNIFICANT CHANGE UP (ref 3.5–5.3)
RBC # BLD: 3.25 M/UL — LOW (ref 3.8–5.2)
RBC # FLD: 13.2 % — SIGNIFICANT CHANGE UP (ref 10.3–14.5)
SODIUM SERPL-SCNC: 141 MMOL/L — SIGNIFICANT CHANGE UP (ref 135–145)
WBC # BLD: 13.75 K/UL — HIGH (ref 3.8–10.5)
WBC # FLD AUTO: 13.75 K/UL — HIGH (ref 3.8–10.5)

## 2024-12-11 PROCEDURE — 99232 SBSQ HOSP IP/OBS MODERATE 35: CPT

## 2024-12-11 RX ORDER — PANTOPRAZOLE SODIUM 40 MG/1
1 TABLET, DELAYED RELEASE ORAL
Qty: 0 | Refills: 0 | DISCHARGE
Start: 2024-12-11

## 2024-12-11 RX ORDER — SENNOSIDES 8.6 MG
2 TABLET ORAL
Qty: 0 | Refills: 0 | DISCHARGE
Start: 2024-12-11

## 2024-12-11 RX ORDER — POLYETHYLENE GLYCOL 3350 17 G/17G
17 POWDER, FOR SOLUTION ORAL
Qty: 0 | Refills: 0 | DISCHARGE
Start: 2024-12-11

## 2024-12-11 RX ORDER — ACETAMINOPHEN 500MG 500 MG/1
2 TABLET, COATED ORAL
Qty: 0 | Refills: 0 | DISCHARGE
Start: 2024-12-11

## 2024-12-11 RX ORDER — OXYCODONE HYDROCHLORIDE 30 MG/1
1 TABLET ORAL
Qty: 0 | Refills: 0 | DISCHARGE
Start: 2024-12-11

## 2024-12-11 RX ORDER — CELECOXIB 200 MG/1
1 CAPSULE ORAL
Qty: 0 | Refills: 0 | DISCHARGE
Start: 2024-12-11

## 2024-12-11 RX ADMIN — CELECOXIB 200 MILLIGRAM(S): 200 CAPSULE ORAL at 22:04

## 2024-12-11 RX ADMIN — CELECOXIB 200 MILLIGRAM(S): 200 CAPSULE ORAL at 21:55

## 2024-12-11 RX ADMIN — Medication 100 MILLIGRAM(S): at 02:00

## 2024-12-11 RX ADMIN — ACETAMINOPHEN 500MG 1000 MILLIGRAM(S): 500 TABLET, COATED ORAL at 05:44

## 2024-12-11 RX ADMIN — CELECOXIB 200 MILLIGRAM(S): 200 CAPSULE ORAL at 10:02

## 2024-12-11 RX ADMIN — ACETAMINOPHEN 500MG 1000 MILLIGRAM(S): 500 TABLET, COATED ORAL at 15:12

## 2024-12-11 RX ADMIN — CELECOXIB 200 MILLIGRAM(S): 200 CAPSULE ORAL at 10:01

## 2024-12-11 RX ADMIN — OXYCODONE HYDROCHLORIDE 5 MILLIGRAM(S): 30 TABLET ORAL at 12:00

## 2024-12-11 RX ADMIN — Medication 20 MILLIGRAM(S): at 21:56

## 2024-12-11 RX ADMIN — ACETAMINOPHEN 500MG 1000 MILLIGRAM(S): 500 TABLET, COATED ORAL at 22:04

## 2024-12-11 RX ADMIN — ACETAMINOPHEN 500MG 1000 MILLIGRAM(S): 500 TABLET, COATED ORAL at 15:17

## 2024-12-11 RX ADMIN — Medication 81 MILLIGRAM(S): at 17:48

## 2024-12-11 RX ADMIN — DEXAMETHASONE 101.6 MILLIGRAM(S): 1.5 TABLET ORAL at 05:41

## 2024-12-11 RX ADMIN — PANTOPRAZOLE SODIUM 40 MILLIGRAM(S): 40 TABLET, DELAYED RELEASE ORAL at 05:41

## 2024-12-11 RX ADMIN — OXYCODONE HYDROCHLORIDE 5 MILLIGRAM(S): 30 TABLET ORAL at 11:03

## 2024-12-11 RX ADMIN — ACETAMINOPHEN 500MG 1000 MILLIGRAM(S): 500 TABLET, COATED ORAL at 00:47

## 2024-12-11 RX ADMIN — Medication 81 MILLIGRAM(S): at 05:41

## 2024-12-11 RX ADMIN — ACETAMINOPHEN 500MG 1000 MILLIGRAM(S): 500 TABLET, COATED ORAL at 21:56

## 2024-12-11 RX ADMIN — ACETAMINOPHEN 500MG 1000 MILLIGRAM(S): 500 TABLET, COATED ORAL at 05:41

## 2024-12-11 NOTE — CARE COORDINATION ASSESSMENT. - OTHER PERTINENT DISCHARGE PLANNING INFORMATION:
VILLA met with this 69 year old female admitted from home 12/10/2024 s/p left tKR converted to inpatient because she wants rehab. Patient known to me from prior admission, she gave me permission to speak with her nephew Jose [phone] who is assisting her with selecting rehab. Patient wanted [facility] which she had wanted with last admission but was denied by insurance so she was receptive to us referring to [facility] now. SW spoke with nephew who will review list and provide choices. Patient resides alone still works full time. Seems to be coping well with admission. SW to follow for rehab bed and authorization

## 2024-12-11 NOTE — CARE COORDINATION ASSESSMENT. - NSCAREPROVIDERS_GEN_ALL_CORE_FT
CARE PROVIDERS:  Administration: Hyacinth Franks  Admitting: Thad Alex  Attending: Thad Alex  Consultant: Saurabh Ferro  Infection Control: Caterina Ortega  Nurse: Ramos Morfin  Occupational Therapy: Dalia Dunne  Override: Clarice Wilkinson  PCA/Nursing Assistant: Cynthia Sebastian  PCA/Nursing Assistant: Nara Stauffer  Physical Therapy: Alisha Mtz  Physical Therapy: Suzanne Pearson  Primary Team: Nic Foote  Primary Team: Abdiaziz Roberto  Primary Team: Leda Shepard  Primary Team: Levi Roa  Primary Team: Rajan Marquez  Primary Team: Lobo Moe  Primary Team: Alethea Yancey  Primary Team: Shimon Crawford  Registered Dietitian: Aliya Rust  Research: Levi Morel  Respiratory Therapy: Fabricio Vega  Respiratory Therapy: Azael Rodriges  Respiratory Therapy: Paty Dozier  : Melanie Her  Team: [facility] Hospitalists, Team

## 2024-12-11 NOTE — OCCUPATIONAL THERAPY INITIAL EVALUATION ADULT - WEIGHT-BEARING RESTRICTIONS: TOILET, REHAB EVAL
If bleeding occurs, stop Aspirin and call primary MD. Continue Eliquis as ordered to help reduce the risk of a stroke.      
weight-bearing as tolerated

## 2024-12-11 NOTE — PHARMACOTHERAPY INTERVENTION NOTE - COMMENTS
Transition of Care video discharge education - medication calendar given to patient   Sabrina Yates, Clinical Pharmacist Orthopedic Surgery 
Admission medication reconciliation POD1

## 2024-12-11 NOTE — SOCIAL WORK PROGRESS NOTE - NSSWPROGRESSNOTE_GEN_ALL_CORE
Patient accepted to [facility] which is first choice. VILLA called insurance [phone] spoke with Essie schuster who setup case [id number] and I submitted POA to fax [phone]. Plan for [facility] pending insurance approval  Patient accepted to [facility] which is first choice. VILLA called insurance [phone] spoke with Essie schuster who setup case [id number] and I submitted POA to fax [phone]. Plan for [facility] pending insurance approval. Consult for [facility] at [place] marked as completed, patient and family in agreement that since she was referred to [facility] in past and was denied by insurance that we would pursue [facility]

## 2024-12-11 NOTE — CARE COORDINATION ASSESSMENT. - NSPASTMEDSURGHISTORY_GEN_ALL_CORE_FT
PAST MEDICAL & SURGICAL HISTORY:  Osteoarthritis of right knee      HLD (hyperlipidemia)      HTN (hypertension)      Osteoarthritis of left knee      Left knee pain      H/O total knee replacement, right      
Admitted

## 2024-12-11 NOTE — SOCIAL WORK PROGRESS NOTE - NSSWPROGRESSNOTE_GEN_ALL_CORE
Jamir spoke with nephew who provided choices for [place], [facility] and [facility]. JAMIR explained that she is medically optimized pending bed and insurance per IDR. SW referred and will follow for bed and approval from insurance  Jamir spoke with nephew who provided choices for [place], [facility] and [facility]. SW explained that she is medically optimized pending bed and insurance per IDR. SW referred and will follow for bed and approval from insurance. Consult for rehab marked as completed

## 2024-12-11 NOTE — OCCUPATIONAL THERAPY INITIAL EVALUATION ADULT - MD/RN NOTIFIED
Interval Events: Abdominal pain is improving, + flatus, denies N/V, (-)Bms    MEDICATIONS  (STANDING):  alvimopan 12 milliGRAM(s) Oral two times a day  aspirin  chewable 81 milliGRAM(s) Oral daily  atorvastatin 40 milliGRAM(s) Oral at bedtime  cholecalciferol 1000 Unit(s) Oral daily  clopidogrel Tablet 75 milliGRAM(s) Oral daily  docusate sodium 100 milliGRAM(s) Oral three times a day  enoxaparin Injectable 40 milliGRAM(s) SubCutaneous daily  gabapentin 100 milliGRAM(s) Oral two times a day  influenza   Vaccine 0.5 milliLiter(s) IntraMuscular once  ketorolac   Injectable 15 milliGRAM(s) IV Push every 6 hours  levETIRAcetam 500 milliGRAM(s) Oral every 8 hours  metoprolol     tartrate 50 milliGRAM(s) Oral every 12 hours  pantoprazole    Tablet 40 milliGRAM(s) Oral before breakfast    MEDICATIONS  (PRN):  HYDROmorphone  Injectable 0.5 milliGRAM(s) IV Push every 4 hours PRN Severe Pain (7 - 10)  morphine  - Injectable 4 milliGRAM(s) IV Push every 4 hours PRN Moderate Pain (4 - 6)  morphine  - Injectable 1 milliGRAM(s) IV Push every 4 hours PRN Mild Pain (1 - 3)      Allergies    No Known Allergies    Intolerances        Review of Systems:    General:  No wt loss, fevers, chills, night sweats,fatigue,   Eyes:  Good vision, no reported pain  ENT:  No sore throat, pain, runny nose, dysphagia  CV:  No pain, palpitations, hypo/hypertension  Resp:  No dyspnea, cough, tachypnea, wheezing  GI:  + pain, No nausea, No vomiting, No diarrhea, No constipation, No weight loss, No fever, No pruritis, No rectal bleeding, No melena, No dysphagia  :  No pain, bleeding, incontinence, nocturia  Muscle:  No pain, weakness  Neuro:  No weakness, tingling, memory problems  Psych:  No fatigue, insomnia, mood problems, depression  Endocrine:  No polyuria, polydypsia, cold/heat intolerance  Heme:  No petechiae, ecchymosis, easy bruisability  Skin:  No rash, tattoos, scars, edema        Vital Signs Last 24 Hrs  T(C): 36.8 (14 Feb 2018 07:39), Max: 36.8 (13 Feb 2018 15:59)  T(F): 98.3 (14 Feb 2018 07:39), Max: 98.3 (13 Feb 2018 15:59)  HR: 75 (14 Feb 2018 07:39) (69 - 85)  BP: 147/71 (14 Feb 2018 07:39) (115/68 - 147/71)  BP(mean): --  RR: 17 (14 Feb 2018 07:39) (16 - 17)  SpO2: 95% (14 Feb 2018 07:39) (93% - 95%)    PHYSICAL EXAM:    Constitutional: NAD, well-developed  HEENT: EOMI, throat clear  Neck: No LAD, supple  Respiratory: CTA and P  Cardiovascular: S1 and S2, RRR, no M  Gastrointestinal: BS+, soft, NT/ND, neg HSM,  Extremities: No peripheral edema, neg clubbing, cyanosis  Vascular: 2+ peripheral pulses  Neurological: A/O x 3, no focal deficits  Psychiatric: Normal mood, normal affect  Skin: No rashes      LABS:                        14.0   19.0  )-----------( 143      ( 13 Feb 2018 08:25 )             42.1     02-13    142  |  109<H>  |  14  ----------------------------<  116<H>  3.7   |  24  |  0.69    Ca    8.6      13 Feb 2018 08:25            RADIOLOGY & ADDITIONAL TESTS: yes

## 2024-12-12 ENCOUNTER — TRANSCRIPTION ENCOUNTER (OUTPATIENT)
Age: 69
End: 2024-12-12

## 2024-12-12 VITALS
OXYGEN SATURATION: 99 % | TEMPERATURE: 97 F | DIASTOLIC BLOOD PRESSURE: 72 MMHG | HEART RATE: 82 BPM | SYSTOLIC BLOOD PRESSURE: 145 MMHG | RESPIRATION RATE: 16 BRPM

## 2024-12-12 PROCEDURE — 97161 PT EVAL LOW COMPLEX 20 MIN: CPT

## 2024-12-12 PROCEDURE — 97530 THERAPEUTIC ACTIVITIES: CPT

## 2024-12-12 PROCEDURE — 73560 X-RAY EXAM OF KNEE 1 OR 2: CPT

## 2024-12-12 PROCEDURE — 97110 THERAPEUTIC EXERCISES: CPT

## 2024-12-12 PROCEDURE — C1889: CPT

## 2024-12-12 PROCEDURE — 80048 BASIC METABOLIC PNL TOTAL CA: CPT

## 2024-12-12 PROCEDURE — 36415 COLL VENOUS BLD VENIPUNCTURE: CPT

## 2024-12-12 PROCEDURE — 99232 SBSQ HOSP IP/OBS MODERATE 35: CPT

## 2024-12-12 PROCEDURE — S2900: CPT

## 2024-12-12 PROCEDURE — 97535 SELF CARE MNGMENT TRAINING: CPT

## 2024-12-12 PROCEDURE — 97116 GAIT TRAINING THERAPY: CPT

## 2024-12-12 PROCEDURE — C1713: CPT

## 2024-12-12 PROCEDURE — 85027 COMPLETE CBC AUTOMATED: CPT

## 2024-12-12 PROCEDURE — C1776: CPT

## 2024-12-12 PROCEDURE — 97165 OT EVAL LOW COMPLEX 30 MIN: CPT

## 2024-12-12 RX ORDER — OMEPRAZOLE 20 MG/1
1 CAPSULE, DELAYED RELEASE ORAL
Qty: 30 | Refills: 1
Start: 2024-12-12 | End: 2025-02-09

## 2024-12-12 RX ORDER — ONDANSETRON HYDROCHLORIDE 4 MG/1
4 TABLET, FILM COATED ORAL ONCE
Refills: 0 | Status: COMPLETED | OUTPATIENT
Start: 2024-12-12 | End: 2024-12-12

## 2024-12-12 RX ORDER — OXYCODONE HYDROCHLORIDE 30 MG/1
1 TABLET ORAL
Qty: 35 | Refills: 0
Start: 2024-12-12

## 2024-12-12 RX ORDER — CELECOXIB 200 MG/1
1 CAPSULE ORAL
Qty: 60 | Refills: 1
Start: 2024-12-12 | End: 2025-02-09

## 2024-12-12 RX ORDER — OXYCODONE HYDROCHLORIDE 30 MG/1
1 TABLET ORAL
Qty: 42 | Refills: 0
Start: 2024-12-12 | End: 2024-12-18

## 2024-12-12 RX ADMIN — OXYCODONE HYDROCHLORIDE 5 MILLIGRAM(S): 30 TABLET ORAL at 13:09

## 2024-12-12 RX ADMIN — Medication 81 MILLIGRAM(S): at 05:32

## 2024-12-12 RX ADMIN — CELECOXIB 200 MILLIGRAM(S): 200 CAPSULE ORAL at 09:56

## 2024-12-12 RX ADMIN — OXYCODONE HYDROCHLORIDE 5 MILLIGRAM(S): 30 TABLET ORAL at 13:39

## 2024-12-12 RX ADMIN — CELECOXIB 200 MILLIGRAM(S): 200 CAPSULE ORAL at 09:26

## 2024-12-12 RX ADMIN — ONDANSETRON HYDROCHLORIDE 4 MILLIGRAM(S): 4 TABLET, FILM COATED ORAL at 15:07

## 2024-12-12 RX ADMIN — PANTOPRAZOLE SODIUM 40 MILLIGRAM(S): 40 TABLET, DELAYED RELEASE ORAL at 05:32

## 2024-12-12 RX ADMIN — ACETAMINOPHEN 500MG 1000 MILLIGRAM(S): 500 TABLET, COATED ORAL at 13:45

## 2024-12-12 RX ADMIN — ACETAMINOPHEN 500MG 1000 MILLIGRAM(S): 500 TABLET, COATED ORAL at 05:32

## 2024-12-12 RX ADMIN — ACETAMINOPHEN 500MG 1000 MILLIGRAM(S): 500 TABLET, COATED ORAL at 05:40

## 2024-12-12 RX ADMIN — ACETAMINOPHEN 500MG 1000 MILLIGRAM(S): 500 TABLET, COATED ORAL at 13:15

## 2024-12-12 NOTE — DISCHARGE NOTE NURSING/CASE MANAGEMENT/SOCIAL WORK - NSDCPEFALRISK_GEN_ALL_CORE
For information on Fall & Injury Prevention, visit: [email or web address] OR  [email or web address] OR  https://www.cdc.gov/steadi/patient.html

## 2024-12-12 NOTE — SOCIAL WORK PROGRESS NOTE - NSSWPROGRESSNOTE_GEN_ALL_CORE
Patient approved for admission to [facility] Libby Simpson called [phone] ext [phone] approval [id number] and told us that this rehab is not in network I called her nephew and met with patient to advise of [facility] and per Lela may not have bed. Patient now feels she prefers home with home care, I spoke with PT who said they can transition her home today. SW updated . Per insurance if rehab was needed they can change approval  with updated rehab and MD NPI at accepting rehab. The  to cover while in rehab Vianca Bam [phone] ext [phone]. Now plan for home with home care

## 2024-12-12 NOTE — CASE MANAGEMENT PROGRESS NOTE - NSCMPROGRESSNOTE_GEN_ALL_CORE
CM met with patient and gave patient Rolling Walker and patient signed Cosign ment form. CM send to [facility] [phone]. Patient stated that her nephew Ashwin phone number  [phone] will be picking her up today for Discharge. Will continue to follow patient.   CM called nephew and will be here at 2 clock to  patient. Will continue to follow.

## 2024-12-12 NOTE — PROGRESS NOTE ADULT - SUBJECTIVE AND OBJECTIVE BOX
SUBJECTIVE: Post operative check of a Left TKR of Dr Alex. Patient seen and examined. No nausea/vomiting, nor shortness of breath. Patient is sitting up in bed in PACU drinking coffee.     OBJECTIVE:     Vital Signs Last 24 Hrs  T(C): 36.4 (10 Dec 2024 14:45), Max: 36.5 (10 Dec 2024 08:33)  T(F): 97.6 (10 Dec 2024 14:45), Max: 97.7 (10 Dec 2024 08:33)  HR: 82 (10 Dec 2024 14:45) (78 - 89)  BP: 127/55 (10 Dec 2024 14:45) (101/80 - 142/75)  BP(mean): --  RR: 16 (10 Dec 2024 14:45) (16 - 19)  SpO2: 99% (10 Dec 2024 14:45) (97% - 100%)    Parameters below as of 10 Dec 2024 14:15  Patient On (Oxygen Delivery Method): room air        PAIN SCORE:  1 with movement       SCALE USED: (1-10 VNRS)        Affected extremity:          Dressing: clean/dry/intact post operative Ahn dressing with Cryocuff present on the left knee.           Sensation:  intact to light touch to bilateral feet         Motor exam:          5/ 5 Tibialis Anterior/Gastrocnemius-Soleus , EHL         warm well perfused; capillary refill <3 seconds     LABS:  none to be addressed today      MEDICATIONS:    Anticoagulation: Ecotrin 81 mg Q12      Antibiotics: ancef x2 doses      Pain medications:   HYDROmorphone  Injectable 0.2 milliGRAM(s) IV Push every 10 minutes PRN  HYDROmorphone  Injectable 0.5 milliGRAM(s) IV Push every 10 minutes PRN  ondansetron Injectable 4 milliGRAM(s) IV Push once PRN      A/P :  s/p  Left TKR POD # 0  -    Pain control  -    DVT ppx: ASA   -    Check AM labs  -    Weight bearing status: WBAT   -    Physical Therapy  -    Dispo: [facility], patient wants to go to [facility] 
Orthopedics  POD#:  1  S/P: Left Total Knee Arthroplasty                       SUBJECTIVE: Patient seen and examined at bedside. Denies nausea, vomiting, diarrhea, chest pain, shortness of breath, headache, dizziness.  Reported Pain Score = 5/10    OBJECTIVE:     Vital Signs Last 24 Hrs  T(C): 36.5 (11 Dec 2024 07:58), Max: 36.6 (10 Dec 2024 15:25)  T(F): 97.7 (11 Dec 2024 07:58), Max: 97.8 (10 Dec 2024 15:25)  HR: 70 (11 Dec 2024 07:58) (70 - 91)  BP: 119/66 (11 Dec 2024 07:58) (97/56 - 128/60)  BP(mean): --  RR: 18 (11 Dec 2024 07:58) (16 - 18)  SpO2: 97% (11 Dec 2024 07:58) (95% - 100%)    Parameters below as of 11 Dec 2024 07:58  Patient On (Oxygen Delivery Method): room air        Left Knee:          Dressing: clean/dry/intact    Bilateral LEs:         Sensation:  intact to light touch          Motor exam:  5/5 dorsiflexion/plantarflexion/EHL          2+ DP and PT pulses          calf supple, NT         SCDs in place    LABS:                        9.4    13.75 )-----------( 273      ( 11 Dec 2024 06:30 )             28.5     12-11    141  |  107  |  9   ----------------------------<  145[H]  4.5   |  27  |  0.69    Ca    8.8      11 Dec 2024 06:30        Urinalysis Basic - ( 11 Dec 2024 06:30 )    Color: x / Appearance: x / SG: x / pH: x  Gluc: 145 mg/dL / Ketone: x  / Bili: x / Urobili: x   Blood: x / Protein: x / Nitrite: x   Leuk Esterase: x / RBC: x / WBC x   Sq Epi: x / Non Sq Epi: x / Bacteria: x        MEDICATIONS:  Anticoagulation:  aspirin enteric coated 81 milliGRAM(s) Oral every 12 hours      Pain medications:   acetaminophen     Tablet .. 1000 milliGRAM(s) Oral every 8 hours  celecoxib 200 milliGRAM(s) Oral every 12 hours  HYDROmorphone  Injectable 0.5 milliGRAM(s) IV Push every 3 hours PRN  ondansetron Injectable 4 milliGRAM(s) IV Push every 6 hours PRN  oxyCODONE    IR 5 milliGRAM(s) Oral every 3 hours PRN  oxyCODONE    IR 10 milliGRAM(s) Oral every 3 hours PRN      A/P: s/p Left Total Knee Arthroplasty POD # 1  -    Pain control  -    DVT ppx: Aspirin 81mg BID  -    Weight bearing status: WBAT LLE  -    Physical Therapy  -    Occupational Therapy  -    Discharge plan: rehab tomorrow pending placement and medical clearance
Patient is a 69y old  Female who presents with a chief complaint of TKA (11 Dec 2024 10:16)      INTERVAL HPI/OVERNIGHT EVENTS:  Patient seen and examined in am, feels well, able to ambulate with walker, pain is well controlled after taking medication. Denies dizziness fever, chills, nausea, vomiting.     ROS reviewed and is otherwise negative        Vital Signs Last 24 Hrs  T(C): 36.5 (11 Dec 2024 07:58), Max: 36.6 (10 Dec 2024 15:25)  T(F): 97.7 (11 Dec 2024 07:58), Max: 97.8 (10 Dec 2024 15:25)  HR: 70 (11 Dec 2024 07:58) (70 - 91)  BP: 119/66 (11 Dec 2024 07:58) (97/56 - 128/60)  BP(mean): --  RR: 18 (11 Dec 2024 07:58) (16 - 18)  SpO2: 97% (11 Dec 2024 07:58) (95% - 100%)    Parameters below as of 11 Dec 2024 07:58  Patient On (Oxygen Delivery Method): room air        PHYSICAL EXAM:  GENERAL: NAD, well-groomed, well-developed  HEAD:  Atraumatic, Normocephalic  EYES: EOMI, PERRLA  ENMT: Moist mucous membranes, No lesions; No tonsillar erythema  NECK: Supple, No JVD   NERVOUS SYSTEM:  Alert & Oriented X3, Good concentration; All 4 extremities mobile, no gross sensory deficits.   CHEST/LUNG: Clear to auscultation bilaterally; No rales, rhonchi, wheezing, or rubs  HEART: Regular rate and rhythm; No murmurs, rubs, or gallops  ABDOMEN: Soft, Nontender, Nondistended; Bowel sounds present  EXTREMITIES:  + Pulses, left knee site c/d/i  SKIN: No rashes or lesions    MEDICATIONS  (STANDING):  acetaminophen     Tablet .. 1000 milliGRAM(s) Oral every 8 hours  aspirin enteric coated 81 milliGRAM(s) Oral every 12 hours  atorvastatin 20 milliGRAM(s) Oral at bedtime  celecoxib 200 milliGRAM(s) Oral every 12 hours  lactated ringers. 1000 milliLiter(s) (100 mL/Hr) IV Continuous <Continuous>  pantoprazole    Tablet 40 milliGRAM(s) Oral before breakfast  polyethylene glycol 3350 17 Gram(s) Oral at bedtime  senna 2 Tablet(s) Oral at bedtime    MEDICATIONS  (PRN):  HYDROmorphone  Injectable 0.5 milliGRAM(s) IV Push every 3 hours PRN Breakthrough pain  magnesium hydroxide Suspension 30 milliLiter(s) Oral daily PRN Constipation  ondansetron Injectable 4 milliGRAM(s) IV Push every 6 hours PRN Nausea and/or Vomiting  oxyCODONE    IR 5 milliGRAM(s) Oral every 3 hours PRN Moderate Pain (4 - 6)  oxyCODONE    IR 10 milliGRAM(s) Oral every 3 hours PRN Severe Pain (7 - 10)      Allergies    No Known Allergies    Intolerances          LABS:                        9.4    13.75 )-----------( 273      ( 11 Dec 2024 06:30 )             28.5     11 Dec 2024 06:30    141    |  107    |  9      ----------------------------<  145    4.5     |  27     |  0.69     Ca    8.8        11 Dec 2024 06:30        Urinalysis Basic - ( 11 Dec 2024 06:30 )    Color: x / Appearance: x / SG: x / pH: x  Gluc: 145 mg/dL / Ketone: x  / Bili: x / Urobili: x   Blood: x / Protein: x / Nitrite: x   Leuk Esterase: x / RBC: x / WBC x   Sq Epi: x / Non Sq Epi: x / Bacteria: x      CAPILLARY BLOOD GLUCOSE          RADIOLOGY & ADDITIONAL TESTS:        Care Discussed with Consultants/Other Providers:    Advanced Directives: [ ] DNR  [ ] No feeding tube  [ ] MOLST in chart  [ ] MOLST completed today  [ ] Unknown  
Discharge medication calendar:  ASA EC 81mg q12h x 4 weeks  Omeprazole 20mg QAM x 4 weeks  Celecoxib 200mg q12h x 2-3 weeks  APAP 1000mg q8h x 2-3 weeks  Narcotic PRN  Docusate 100mg TID while taking narcotic  Miralax, Senna, or Bisacodyl PRN for treatment of constipation  
Patient is a 69y old  Female who presents with a chief complaint of TKA (11 Dec 2024 10:16)      INTERVAL HPI/OVERNIGHT EVENTS:  Patient seen and examined in am, feels well, able to ambulate with walker, pain is well controlled. Denies dizziness fever, chills, nausea, vomiting.     ROS reviewed and is otherwise negative        Vital Signs Last 24 Hrs  T(C): 36.3 (12 Dec 2024 08:24), Max: 36.6 (11 Dec 2024 16:35)  T(F): 97.4 (12 Dec 2024 08:24), Max: 97.9 (11 Dec 2024 16:35)  HR: 82 (12 Dec 2024 08:24) (76 - 82)  BP: 145/72 (12 Dec 2024 08:24) (114/64 - 145/72)  BP(mean): --  RR: 16 (12 Dec 2024 08:24) (16 - 18)  SpO2: 99% (12 Dec 2024 08:24) (98% - 99%)    Parameters below as of 11 Dec 2024 23:30  Patient On (Oxygen Delivery Method): room air        PHYSICAL EXAM:  GENERAL: NAD, well-groomed, well-developed  HEAD:  Atraumatic, Normocephalic  EYES: EOMI, PERRLA  ENMT: Moist mucous membranes, No lesions; No tonsillar erythema  NECK: Supple, No JVD  NERVOUS SYSTEM:  Alert & Oriented X3, Good concentration; All 4 extremities mobile, no gross sensory deficits.   CHEST/LUNG: Clear to auscultation bilaterally; No rales, rhonchi, wheezing, or rubs  HEART: Regular rate and rhythm; No murmurs, rubs, or gallops  ABDOMEN: Soft, Nontender, Nondistended; Bowel sounds present  EXTREMITIES:  + Pulses, left knee site c/d/i  SKIN: No rashes or lesions    MEDICATIONS  (STANDING):  acetaminophen     Tablet .. 1000 milliGRAM(s) Oral every 8 hours  amLODIPine   Tablet 2.5 milliGRAM(s) Oral daily  aspirin enteric coated 81 milliGRAM(s) Oral every 12 hours  atorvastatin 20 milliGRAM(s) Oral at bedtime  celecoxib 200 milliGRAM(s) Oral every 12 hours  lactated ringers. 1000 milliLiter(s) (100 mL/Hr) IV Continuous <Continuous>  pantoprazole    Tablet 40 milliGRAM(s) Oral before breakfast  polyethylene glycol 3350 17 Gram(s) Oral at bedtime  senna 2 Tablet(s) Oral at bedtime    MEDICATIONS  (PRN):  HYDROmorphone  Injectable 0.5 milliGRAM(s) IV Push every 3 hours PRN Breakthrough pain  magnesium hydroxide Suspension 30 milliLiter(s) Oral daily PRN Constipation  ondansetron Injectable 4 milliGRAM(s) IV Push every 6 hours PRN Nausea and/or Vomiting  oxyCODONE    IR 5 milliGRAM(s) Oral every 3 hours PRN Moderate Pain (4 - 6)  oxyCODONE    IR 10 milliGRAM(s) Oral every 3 hours PRN Severe Pain (7 - 10)      Allergies    No Known Allergies    Intolerances          LABS:      Ca    8.8        11 Dec 2024 06:30        Urinalysis Basic - ( 11 Dec 2024 06:30 )    Color: x / Appearance: x / SG: x / pH: x  Gluc: 145 mg/dL / Ketone: x  / Bili: x / Urobili: x   Blood: x / Protein: x / Nitrite: x   Leuk Esterase: x / RBC: x / WBC x   Sq Epi: x / Non Sq Epi: x / Bacteria: x      CAPILLARY BLOOD GLUCOSE          RADIOLOGY & ADDITIONAL TESTS:        Care Discussed with Consultants/Other Providers:    Advanced Directives: [ ] DNR  [ ] No feeding tube  [ ] MOLST in chart  [ ] MOLST completed today  [ ] Unknown  
Procedure:  Left TKR  POD #: 2  SUBJECTIVE:   Pt without complaints. No SOB,CP, N/V. Tolerated Diet well. Knee pain comfortable (3-4/10 ) on  Interval Rx.   Voided No BM yet, + flatus, No abdominal pain.   PT activity yesterday: Stood & stepped / Walked with walker  Pain Rx:  acetaminophen     Tablet .. 1000 milliGRAM(s) Oral every 8 hours  celecoxib 200 milliGRAM(s) Oral every 12 hours  HYDROmorphone  Injectable 0.5 milliGRAM(s) IV Push every 3 hours PRN  ondansetron Injectable 4 milliGRAM(s) IV Push every 6 hours PRN  oxyCODONE    IR 5 milliGRAM(s) Oral every 3 hours PRN  oxyCODONE    IR 10 milliGRAM(s) Oral every 3 hours PRN    OBJECTIVE:   General: On exam, No Apparent Distress  Vital Signs Last 24 Hrs  T(C): 36.3 (12 Dec 2024 08:24), Max: 36.6 (11 Dec 2024 16:35)  T(F): 97.4 (12 Dec 2024 08:24), Max: 97.9 (11 Dec 2024 16:35)  HR: 82 (12 Dec 2024 08:24) (76 - 82)  BP: 145/72 (12 Dec 2024 08:24) (114/64 - 145/72)  RR: 16 (12 Dec 2024 08:24) (16 - 18)  SpO2: 99% (12 Dec 2024 08:24) (98% - 99%)    [Abdomen]: + BS, soft , benign exam   Ext(Knee): Left Knee Mepilex Dressing clean, dry, & intact; No  cellulitis; Min effusion [ soft ]. Minimal soft tissue swelling knee/thigh; [Mod/Severe STS; No fluctuance, No crepitus].   ROM: Extension 0 deg. ; Flexion 60 deg. PROM  Neurologic:  Has sensation over feet & toes bilat. Full AROM bilat feet & toes. EHL/AT = 5/5  Vascular: Feet toes warm, pink. DP = 2+. No calf tenderness bilat..  Urine Out [11P-7A] = Voided ml; HVAC = N/A    VTEP: On Bilat. Venodynes + aspirin enteric coated 81 milliGRAM(s) Oral every 12 hours    Hospitalist input noted.    Labs Today:   Chem:                      9.4    13.75 )-----------( 273      ( 11 Dec 2024 06:30 )             28.5     12-11  Chem:  141  |  107  |  9   ----------------------------<  145[H]  4.5   |  27  |  0.69    Ca    8.8      11 Dec 2024 06:30        Primary Orthopedic Assessment:  • Stable from Orthopedic perspective  • Neuro motor exam stable  • Labs: CBC with mild Post-Op Anemia/ Chem stable      Plan:   • Continue:  PT/OT/WBAT with assistance of a walker/Ice to knee/ Knee ROM         Incentive spirometry encouraged   • Continue DVT prophylaxis as prescribed, including use of compression devices and ankle pumps  • Continue Pain Rx  • Plans per Medicine / Anesthesia / Cardiology  • Discharge planning – anticipated discharge is Home D/C with Home care & Home PT when medically stable & cleared by PT/OT

## 2024-12-12 NOTE — CASE MANAGEMENT PROGRESS NOTE - NSCMPROGRESSNOTE_GEN_ALL_CORE
The patient has a mobility limitation that significantly impairs the ability to participate in one are more mobility related activities of daily living in the home. The patient is able to safely use the rolling walker. The functional mobility deficit can be sufficiently resolved by use of a walker.

## 2024-12-12 NOTE — DISCHARGE NOTE NURSING/CASE MANAGEMENT/SOCIAL WORK - NSSCNAMETXT_GEN_ALL_CORE
[facility] care agency [phone] will reach out to you within 24-72 hours of your discharge to schedule home care visit/eval appointment with you. Please call agency for any queries regarding home care services

## 2024-12-12 NOTE — PROGRESS NOTE ADULT - ASSESSMENT
70 yo female, pmh of htn, hld, OA, presenting for left knee replacement.      Left knee replacement  - pain control - standing tylenol 1000 q8h  - celebrex 200 bid  oxycodone 5/10 prn for mod/severe pain  - bowel regimen   - aspirin bid for dvt ppx   - no medical contraindication to DC      HTN - continue norvasc    hld- continue lipitor    
70 yo female, pmh of htn, hld, OA, presenting for left knee replacement.      Left knee replacement  - pain control - standing tylenol 1000 q8h,  - celebrex 200 bid  oxycodone 5/10 prn for mod/severe pain, dilaudid iv for breakthrough pain  - bowel regimen   - aspirin bid for dvt ppx   - no medical contraindication to DC      HTN - can resume norvasc in am    hld- continue lipitor

## 2024-12-12 NOTE — DISCHARGE NOTE NURSING/CASE MANAGEMENT/SOCIAL WORK - FINANCIAL ASSISTANCE
[facility] provides services at a reduced cost to those who are determined to be eligible through [facility]’s financial assistance program. Information regarding [facility]’s financial assistance program can be found by going to [email or web address] or by calling [phone].

## 2024-12-12 NOTE — DISCHARGE NOTE NURSING/CASE MANAGEMENT/SOCIAL WORK - MODE OF TRANSPORTATION
Vaccine Information Statement(s) for Tetanus/Diphtheria/Pertussis (Tdap) given and reviewed, questions answered, verbal consent given by Patient for injection(s) administered.      Tetanus/Diphtheria/Pertussis (Tdap) given per verbal order from ESTELA Sharpe, see immunization grid for documenation.   Wheelchair/Stroller

## 2024-12-12 NOTE — DISCHARGE NOTE NURSING/CASE MANAGEMENT/SOCIAL WORK - PATIENT PORTAL LINK FT
You can access the FollowMyHealth Patient Portal offered by [facility] by registering at the following website: [email or web address]. By joining [employer]’s FollowMyHealth portal, you will also be able to view your health information using other applications (apps) compatible with our system.

## 2024-12-26 ENCOUNTER — APPOINTMENT (OUTPATIENT)
Dept: ORTHOPEDIC SURGERY | Facility: CLINIC | Age: 69
End: 2024-12-26
Payer: COMMERCIAL

## 2024-12-26 VITALS — HEIGHT: 59 IN | WEIGHT: 126 LBS | BODY MASS INDEX: 25.4 KG/M2

## 2024-12-26 DIAGNOSIS — Z96.652 PRESENCE OF LEFT ARTIFICIAL KNEE JOINT: ICD-10-CM

## 2024-12-26 DIAGNOSIS — Z98.890 OTHER SPECIFIED POSTPROCEDURAL STATES: ICD-10-CM

## 2024-12-26 PROCEDURE — 99024 POSTOP FOLLOW-UP VISIT: CPT

## 2024-12-26 PROCEDURE — 73562 X-RAY EXAM OF KNEE 3: CPT | Mod: LT

## 2024-12-26 RX ORDER — OXYCODONE 5 MG/1
5 TABLET ORAL
Qty: 20 | Refills: 0 | Status: ACTIVE | COMMUNITY
Start: 2024-12-26 | End: 1900-01-01

## 2025-01-28 ENCOUNTER — NON-APPOINTMENT (OUTPATIENT)
Age: 70
End: 2025-01-28

## 2025-01-29 ENCOUNTER — APPOINTMENT (OUTPATIENT)
Dept: ORTHOPEDIC SURGERY | Facility: CLINIC | Age: 70
End: 2025-01-29
Payer: COMMERCIAL

## 2025-01-29 ENCOUNTER — NON-APPOINTMENT (OUTPATIENT)
Age: 70
End: 2025-01-29

## 2025-01-29 VITALS — HEIGHT: 60 IN | WEIGHT: 126 LBS | BODY MASS INDEX: 24.74 KG/M2

## 2025-01-29 DIAGNOSIS — Z96.652 PRESENCE OF LEFT ARTIFICIAL KNEE JOINT: ICD-10-CM

## 2025-01-29 PROCEDURE — 99024 POSTOP FOLLOW-UP VISIT: CPT

## 2025-02-24 ENCOUNTER — NON-APPOINTMENT (OUTPATIENT)
Age: 70
End: 2025-02-24

## 2025-03-21 ENCOUNTER — APPOINTMENT (OUTPATIENT)
Dept: ORTHOPEDIC SURGERY | Facility: CLINIC | Age: 70
End: 2025-03-21
Payer: COMMERCIAL

## 2025-03-21 VITALS — BODY MASS INDEX: 24.35 KG/M2 | HEIGHT: 60 IN | WEIGHT: 124 LBS

## 2025-03-21 DIAGNOSIS — Z96.652 PRESENCE OF LEFT ARTIFICIAL KNEE JOINT: ICD-10-CM

## 2025-03-21 PROCEDURE — 99213 OFFICE O/P EST LOW 20 MIN: CPT

## 2025-05-21 ENCOUNTER — APPOINTMENT (OUTPATIENT)
Dept: ORTHOPEDIC SURGERY | Facility: CLINIC | Age: 70
End: 2025-05-21
Payer: COMMERCIAL

## 2025-05-21 VITALS — BODY MASS INDEX: 24.35 KG/M2 | HEIGHT: 60 IN | WEIGHT: 124 LBS

## 2025-05-21 DIAGNOSIS — M25.511 PAIN IN RIGHT SHOULDER: ICD-10-CM

## 2025-05-21 DIAGNOSIS — M67.911 UNSPECIFIED DISORDER OF SYNOVIUM AND TENDON, RIGHT SHOULDER: ICD-10-CM

## 2025-05-21 PROCEDURE — 99214 OFFICE O/P EST MOD 30 MIN: CPT | Mod: 25

## 2025-05-21 PROCEDURE — 73030 X-RAY EXAM OF SHOULDER: CPT | Mod: RT

## 2025-05-21 PROCEDURE — 20610 DRAIN/INJ JOINT/BURSA W/O US: CPT | Mod: RT

## 2025-05-28 ENCOUNTER — NON-APPOINTMENT (OUTPATIENT)
Age: 70
End: 2025-05-28

## (undated) DEVICE — SUT POLYSORB 1 27" GS-12 UNDYED

## (undated) DEVICE — DRSG COMBINE 5 X 9"

## (undated) DEVICE — SUT POLYSORB 2-0 30" GS-11 UNDYED

## (undated) DEVICE — SOLIDIFIER CANN EXPRESS 3K

## (undated) DEVICE — SAW BLADE STRYKER SAGITTAL AGGRESSIVE 12.5X79X1.24MM

## (undated) DEVICE — HOOD FLYTE STRYKER HELMET SHIELD

## (undated) DEVICE — GLV 7.5 PROTEXIS (WHITE)

## (undated) DEVICE — SUT MONOSOF 3-0 30" C-16

## (undated) DEVICE — DRAIN JACKSON PRATT 3 SPRING RESERVOIR W 10FR PVC DRAIN

## (undated) DEVICE — SAW BLADE STRYKER SAGITTAL 25X98.5X1.24MM

## (undated) DEVICE — SYR LUER LOK 50CC

## (undated) DEVICE — GLV 8 PROTEXIS (WHITE)

## (undated) DEVICE — ELCTR STRYKER NEPTUNE SMOKE EVACUATION PENCIL (GREEN)

## (undated) DEVICE — SUT MONOSOF 3-0 18" P-12

## (undated) DEVICE — WARMING BLANKET UPPER ADULT

## (undated) DEVICE — SOL IRR POUR NS 0.9% 500ML

## (undated) DEVICE — STRYKER PULSE LAVAGE WITH COAXIAL MULTI-ORIFICE TIP

## (undated) DEVICE — CRYO/CUFF GRAVITY COOLER KNEE LARGE

## (undated) DEVICE — BLADE SCALPEL SAFETYLOCK #11

## (undated) DEVICE — TOURNIQUET CUFF 34" DUAL PORT W PLC

## (undated) DEVICE — POSITIONER STIRRUP STRAP W SLIP RING 19X3.5"

## (undated) DEVICE — HANDPIECE INTERPULSE W MULTI TIP

## (undated) DEVICE — PACK TOTAL KNEE

## (undated) DEVICE — SUT POLYSORB 0 36" GS-11 UNDYED

## (undated) DEVICE — BAG SPONGE COUNTER EZ

## (undated) DEVICE — DRAPE 3/4 SHEET 52X76"

## (undated) DEVICE — CANISTER SUCTION LID GUARD 3000CC

## (undated) DEVICE — DRAPE INSTRUMENT POUCH 6.75" X 11"

## (undated) DEVICE — SPECIMEN CONTAINER PET

## (undated) DEVICE — SUT MONOCRYL 3-0 27" PS-2 UNDYED

## (undated) DEVICE — WOUND IRR IRRISEPT W 0.5 CHG

## (undated) DEVICE — DRSG DERMABOND PRINEO 22CM

## (undated) DEVICE — DRAPE TOP SHEET 53" X 101"

## (undated) DEVICE — GLV 8 PROTEXIS (BLUE)

## (undated) DEVICE — SOL IRR POUR H2O 1000ML

## (undated) DEVICE — SOL IRR BAG NS 0.9% 3000ML

## (undated) DEVICE — ELCTR AQUAMANTYS BIPOLAR SEALER 6.0

## (undated) DEVICE — SOL IRR POUR NS 0.9% 1000ML

## (undated) DEVICE — NDL HYPO SAFE 18G X 1.5" (PINK)

## (undated) DEVICE — POSITIONER STRAP ARMBOARD VELCRO TS-30

## (undated) DEVICE — VENODYNE/SCD SLEEVE CALF BARIATRIC

## (undated) DEVICE — NDL SPINAL 18G X 3.5" (PINK)

## (undated) DEVICE — DRILL BIT BIOMET QUICK CONNECT 1/8

## (undated) DEVICE — HOOD T5 PEELAWAY

## (undated) DEVICE — SUT MAXON 1 27" T-12

## (undated) DEVICE — GLV 6.5 PROTEXIS (WHITE)

## (undated) DEVICE — SUT MONOSOF 3-0 18" C-14

## (undated) DEVICE — FOLEY TRAY 16FR SURESTEP LTX BG

## (undated) DEVICE — POSITIONER FOAM HEAD CRADLE (PINK)

## (undated) DEVICE — SOL IRR POUR H2O 1500ML

## (undated) DEVICE — DRSG DERMABOND PRINEO 60CM

## (undated) DEVICE — VENODYNE/SCD SLEEVE CALF MEDIUM

## (undated) DEVICE — SUT PDS II 1 27" CT

## (undated) DEVICE — BRACE KNEE IMMOBILIZER SPLINT SUPER LARGE 20"